# Patient Record
Sex: FEMALE | Race: BLACK OR AFRICAN AMERICAN | Employment: OTHER | URBAN - METROPOLITAN AREA
[De-identification: names, ages, dates, MRNs, and addresses within clinical notes are randomized per-mention and may not be internally consistent; named-entity substitution may affect disease eponyms.]

---

## 2017-05-21 RX ORDER — SODIUM CHLORIDE 9 MG/ML
25 INJECTION, SOLUTION INTRAVENOUS CONTINUOUS
Status: DISPENSED | OUTPATIENT
Start: 2017-05-24 | End: 2017-05-25

## 2017-05-24 ENCOUNTER — HOSPITAL ENCOUNTER (OUTPATIENT)
Dept: INFUSION THERAPY | Age: 72
Discharge: HOME | End: 2017-05-24
Payer: MEDICARE

## 2017-05-24 VITALS
HEART RATE: 80 BPM | OXYGEN SATURATION: 99 % | RESPIRATION RATE: 16 BRPM | DIASTOLIC BLOOD PRESSURE: 69 MMHG | TEMPERATURE: 98 F | SYSTOLIC BLOOD PRESSURE: 146 MMHG

## 2017-05-24 PROCEDURE — 74011250636 HC RX REV CODE- 250/636

## 2017-05-24 PROCEDURE — 96366 THER/PROPH/DIAG IV INF ADDON: CPT

## 2017-05-24 PROCEDURE — 96365 THER/PROPH/DIAG IV INF INIT: CPT

## 2017-05-24 RX ORDER — LOSARTAN POTASSIUM 50 MG/1
50 TABLET ORAL DAILY
COMMUNITY

## 2017-05-24 RX ORDER — SODIUM CHLORIDE 9 MG/ML
50 INJECTION, SOLUTION INTRAVENOUS CONTINUOUS
Status: CANCELLED | OUTPATIENT
Start: 2017-05-24 | End: 2017-05-25

## 2017-05-24 RX ORDER — SODIUM CHLORIDE 0.9 % (FLUSH) 0.9 %
5-10 SYRINGE (ML) INJECTION AS NEEDED
Status: CANCELLED | OUTPATIENT
Start: 2017-05-24 | End: 2017-05-25

## 2017-05-24 RX ORDER — PRAVASTATIN SODIUM 20 MG/1
20 TABLET ORAL
COMMUNITY
End: 2021-03-27

## 2017-05-24 RX ADMIN — SODIUM CHLORIDE 25 ML/HR: 900 INJECTION, SOLUTION INTRAVENOUS at 13:48

## 2017-05-24 RX ADMIN — IRON SUCROSE 300 MG: 20 INJECTION, SOLUTION INTRAVENOUS at 13:48

## 2017-05-24 NOTE — PROGRESS NOTES
Outpatient Infusion Center Short Visit Progress Note    1330 Pt admit to [facility] for Venofer day 1 ambulatory in stable condition. Assessment completed. No new concerns voiced. Visit Vitals    /79 (BP 1 Location: Left arm, BP Patient Position: Sitting)    Pulse 70    Temp 97.4 °F (36.3 °C)    Resp 18    SpO2 100%    Breastfeeding No       PIV started in left forearm. Medications:  Venofer    1545 Pt tolerated treatment well. D/c home ambulatory in no distress. Pt aware of next appointment scheduled for 6/7/17 for day 14 .

## 2017-06-07 ENCOUNTER — HOSPITAL ENCOUNTER (OUTPATIENT)
Dept: INFUSION THERAPY | Age: 72
Discharge: HOME | End: 2017-06-07
Payer: MEDICARE

## 2017-06-07 VITALS
HEART RATE: 66 BPM | DIASTOLIC BLOOD PRESSURE: 76 MMHG | RESPIRATION RATE: 16 BRPM | OXYGEN SATURATION: 98 % | SYSTOLIC BLOOD PRESSURE: 150 MMHG | TEMPERATURE: 98 F

## 2017-06-07 PROCEDURE — 96366 THER/PROPH/DIAG IV INF ADDON: CPT

## 2017-06-07 PROCEDURE — 74011250636 HC RX REV CODE- 250/636

## 2017-06-07 PROCEDURE — 96365 THER/PROPH/DIAG IV INF INIT: CPT

## 2017-06-07 RX ORDER — SODIUM CHLORIDE 0.9 % (FLUSH) 0.9 %
5-10 SYRINGE (ML) INJECTION AS NEEDED
Status: ACTIVE | OUTPATIENT
Start: 2017-06-07 | End: 2017-06-08

## 2017-06-07 RX ORDER — SODIUM CHLORIDE 9 MG/ML
25 INJECTION, SOLUTION INTRAVENOUS AS NEEDED
Status: DISPENSED | OUTPATIENT
Start: 2017-06-07 | End: 2017-06-08

## 2017-06-07 RX ADMIN — Medication 10 ML: at 13:43

## 2017-06-07 RX ADMIN — Medication 10 ML: at 15:45

## 2017-06-07 RX ADMIN — SODIUM CHLORIDE 25 ML/HR: 900 INJECTION, SOLUTION INTRAVENOUS at 13:45

## 2017-06-07 RX ADMIN — IRON SUCROSE 300 MG: 20 INJECTION, SOLUTION INTRAVENOUS at 13:58

## 2017-06-07 NOTE — PROGRESS NOTES
[facility] Short Visit Note:    1330:  Pt arrived ambulatory and in no distress, received iron sucrose (VENOFER) 300 mg in 0.9% sodium chloride 250 mL IVPB via 22g placed to the right FA without difficulty, patient tolerated well. Patient Vitals for the past 12 hrs:   Temp Pulse Resp BP SpO2   06/07/17 1329 98.1 °F (36.7 °C) 73 18 154/75 100 %     Infusion complete, IV flushed and removed, site covered with Coban. 1550: D/Cd from [facility] ambulatory and in no distress. Next visit 6/21/14 @ 1130.

## 2017-06-21 ENCOUNTER — HOSPITAL ENCOUNTER (OUTPATIENT)
Dept: INFUSION THERAPY | Age: 72
Discharge: HOME | End: 2017-06-21
Payer: MEDICARE

## 2017-06-21 VITALS
RESPIRATION RATE: 16 BRPM | DIASTOLIC BLOOD PRESSURE: 76 MMHG | SYSTOLIC BLOOD PRESSURE: 146 MMHG | TEMPERATURE: 97.7 F | HEART RATE: 73 BPM

## 2017-06-21 PROCEDURE — 96365 THER/PROPH/DIAG IV INF INIT: CPT

## 2017-06-21 PROCEDURE — 74011250636 HC RX REV CODE- 250/636

## 2017-06-21 PROCEDURE — 96366 THER/PROPH/DIAG IV INF ADDON: CPT

## 2017-06-21 RX ORDER — SODIUM CHLORIDE 0.9 % (FLUSH) 0.9 %
5-10 SYRINGE (ML) INJECTION AS NEEDED
Status: ACTIVE | OUTPATIENT
Start: 2017-06-21 | End: 2017-06-22

## 2017-06-21 RX ORDER — SODIUM CHLORIDE 9 MG/ML
25 INJECTION, SOLUTION INTRAVENOUS AS NEEDED
Status: DISPENSED | OUTPATIENT
Start: 2017-06-21 | End: 2017-06-22

## 2017-06-21 RX ADMIN — SODIUM CHLORIDE 25 ML/HR: 900 INJECTION, SOLUTION INTRAVENOUS at 11:40

## 2017-06-21 RX ADMIN — Medication 10 ML: at 11:40

## 2017-06-21 RX ADMIN — IRON SUCROSE 400 MG: 20 INJECTION, SOLUTION INTRAVENOUS at 12:22

## 2017-06-21 NOTE — PROGRESS NOTES
Outpatient Infusion Center Progress Note    5798 Pt admit to [facility] for day 28 Venofer ambulatory in stable condition. Assessment completed. No new concerns voiced. Peripheral IV accessed in right arm with positive blood return. PIV flushed and NS started at [facility]. Visit Vitals    /76 (BP 1 Location: Left arm, BP Patient Position: Sitting)    Pulse 73    Temp 97.7 °F (36.5 °C)    Resp 16    Breastfeeding No       Medications:  NS KVO  Venofer over 150 minutes    Vitals taken on admission, prior to infusion, and immediately after infusion. Patient Vitals for the past 12 hrs:   Temp Pulse Resp BP   06/21/17 1516 97.7 °F (36.5 °C) 73 16 146/76   06/21/17 1219 97 °F (36.1 °C) (!) 59 16 122/65   06/21/17 1129 98.6 °F (37 °C) 72 16 (!) 131/93       1520 Pt tolerated treatment well. D/c home ambulatory in no distress. There are no other appointments scheduled at this time.

## 2021-03-27 ENCOUNTER — APPOINTMENT (OUTPATIENT)
Dept: MRI IMAGING | Age: 76
End: 2021-03-27
Attending: INTERNAL MEDICINE
Payer: MEDICARE

## 2021-03-27 ENCOUNTER — APPOINTMENT (OUTPATIENT)
Dept: CT IMAGING | Age: 76
End: 2021-03-27
Attending: EMERGENCY MEDICINE
Payer: MEDICARE

## 2021-03-27 ENCOUNTER — HOSPITAL ENCOUNTER (OUTPATIENT)
Age: 76
Setting detail: OBSERVATION
Discharge: HOME | End: 2021-03-30
Attending: EMERGENCY MEDICINE | Admitting: INTERNAL MEDICINE
Payer: MEDICARE

## 2021-03-27 DIAGNOSIS — C34.90 LUNG CANCER METASTATIC TO BRAIN (HCC): ICD-10-CM

## 2021-03-27 DIAGNOSIS — Z71.89 GOALS OF CARE, COUNSELING/DISCUSSION: ICD-10-CM

## 2021-03-27 DIAGNOSIS — R91.8 MASS OF LUNG: Primary | ICD-10-CM

## 2021-03-27 DIAGNOSIS — E43 SEVERE PROTEIN-CALORIE MALNUTRITION (GOMEZ: LESS THAN 60% OF STANDARD WEIGHT) (HCC): ICD-10-CM

## 2021-03-27 DIAGNOSIS — Z71.89 DNR (DO NOT RESUSCITATE) DISCUSSION: ICD-10-CM

## 2021-03-27 DIAGNOSIS — C79.31 LUNG CANCER METASTATIC TO BRAIN (HCC): ICD-10-CM

## 2021-03-27 DIAGNOSIS — C80.1 CANCER (HCC): ICD-10-CM

## 2021-03-27 DIAGNOSIS — Z71.89 ADVANCED CARE PLANNING/COUNSELING DISCUSSION: ICD-10-CM

## 2021-03-27 PROBLEM — E78.5 HYPERLIPIDEMIA: Status: ACTIVE | Noted: 2021-03-27

## 2021-03-27 PROBLEM — I10 HYPERTENSION: Status: ACTIVE | Noted: 2021-03-27

## 2021-03-27 PROBLEM — C79.9 METASTATIC CANCER (HCC): Status: ACTIVE | Noted: 2021-03-27

## 2021-03-27 PROBLEM — R20.0 NUMBNESS AND TINGLING OF LEFT HAND: Status: ACTIVE | Noted: 2021-03-27

## 2021-03-27 PROBLEM — R20.2 NUMBNESS AND TINGLING OF LEFT HAND: Status: ACTIVE | Noted: 2021-03-27

## 2021-03-27 LAB
ALBUMIN SERPL-MCNC: 3.2 G/DL (ref 3.5–5)
ALBUMIN/GLOB SERPL: 0.7 {RATIO} (ref 1.1–2.2)
ALP SERPL-CCNC: 175 U/L (ref 45–117)
ALT SERPL-CCNC: 30 U/L (ref 12–78)
ANION GAP SERPL CALC-SCNC: 7 MMOL/L (ref 5–15)
APTT PPP: 24.8 SEC (ref 22.1–31)
AST SERPL-CCNC: 41 U/L (ref 15–37)
BASOPHILS # BLD: 0 K/UL (ref 0–0.1)
BASOPHILS NFR BLD: 0 % (ref 0–1)
BILIRUB SERPL-MCNC: 0.5 MG/DL (ref 0.2–1)
BUN SERPL-MCNC: 10 MG/DL (ref 6–20)
BUN/CREAT SERPL: 18 (ref 12–20)
CALCIUM SERPL-MCNC: 9.2 MG/DL (ref 8.5–10.1)
CHLORIDE SERPL-SCNC: 100 MMOL/L (ref 97–108)
CHOLEST SERPL-MCNC: 224 MG/DL
CO2 SERPL-SCNC: 29 MMOL/L (ref 21–32)
COMMENT, HOLDF: NORMAL
CREAT SERPL-MCNC: 0.57 MG/DL (ref 0.55–1.02)
DIFFERENTIAL METHOD BLD: ABNORMAL
EOSINOPHIL # BLD: 0 K/UL (ref 0–0.4)
EOSINOPHIL NFR BLD: 0 % (ref 0–7)
ERYTHROCYTE [DISTWIDTH] IN BLOOD BY AUTOMATED COUNT: 13.6 % (ref 11.5–14.5)
EST. AVERAGE GLUCOSE BLD GHB EST-MCNC: 103 MG/DL
GLOBULIN SER CALC-MCNC: 4.3 G/DL (ref 2–4)
GLUCOSE BLD STRIP.AUTO-MCNC: 103 MG/DL (ref 65–100)
GLUCOSE SERPL-MCNC: 96 MG/DL (ref 65–100)
HBA1C MFR BLD: 5.2 % (ref 4–5.6)
HCT VFR BLD AUTO: 36 % (ref 35–47)
HDLC SERPL-MCNC: 55 MG/DL
HDLC SERPL: 4.1 {RATIO} (ref 0–5)
HGB BLD-MCNC: 12.2 G/DL (ref 11.5–16)
IMM GRANULOCYTES # BLD AUTO: 0 K/UL (ref 0–0.04)
IMM GRANULOCYTES NFR BLD AUTO: 0 % (ref 0–0.5)
INR PPP: 1.1 (ref 0.9–1.1)
LDLC SERPL CALC-MCNC: 156.2 MG/DL (ref 0–100)
LIPASE SERPL-CCNC: 59 U/L (ref 73–393)
LIPID PROFILE,FLP: ABNORMAL
LYMPHOCYTES # BLD: 1.4 K/UL (ref 0.8–3.5)
LYMPHOCYTES NFR BLD: 16 % (ref 12–49)
MCH RBC QN AUTO: 29.6 PG (ref 26–34)
MCHC RBC AUTO-ENTMCNC: 33.9 G/DL (ref 30–36.5)
MCV RBC AUTO: 87.4 FL (ref 80–99)
MONOCYTES # BLD: 0.3 K/UL (ref 0–1)
MONOCYTES NFR BLD: 4 % (ref 5–13)
NEUTS SEG # BLD: 6.7 K/UL (ref 1.8–8)
NEUTS SEG NFR BLD: 80 % (ref 32–75)
NRBC # BLD: 0 K/UL (ref 0–0.01)
NRBC BLD-RTO: 0 PER 100 WBC
PLATELET # BLD AUTO: 318 K/UL (ref 150–400)
PMV BLD AUTO: 9.5 FL (ref 8.9–12.9)
POTASSIUM SERPL-SCNC: 3.5 MMOL/L (ref 3.5–5.1)
PROT SERPL-MCNC: 7.5 G/DL (ref 6.4–8.2)
PROTHROMBIN TIME: 11.2 SEC (ref 9–11.1)
RBC # BLD AUTO: 4.12 M/UL (ref 3.8–5.2)
SAMPLES BEING HELD,HOLD: NORMAL
SERVICE CMNT-IMP: ABNORMAL
SODIUM SERPL-SCNC: 136 MMOL/L (ref 136–145)
THERAPEUTIC RANGE,PTTT: NORMAL SECS (ref 58–77)
TRIGL SERPL-MCNC: 64 MG/DL (ref ?–150)
TROPONIN I SERPL-MCNC: <0.05 NG/ML
VLDLC SERPL CALC-MCNC: 12.8 MG/DL
WBC # BLD AUTO: 8.4 K/UL (ref 3.6–11)

## 2021-03-27 PROCEDURE — 74011250636 HC RX REV CODE- 250/636: Performed by: INTERNAL MEDICINE

## 2021-03-27 PROCEDURE — 93005 ELECTROCARDIOGRAM TRACING: CPT

## 2021-03-27 PROCEDURE — 96374 THER/PROPH/DIAG INJ IV PUSH: CPT

## 2021-03-27 PROCEDURE — 82962 GLUCOSE BLOOD TEST: CPT

## 2021-03-27 PROCEDURE — 85025 COMPLETE CBC W/AUTO DIFF WBC: CPT

## 2021-03-27 PROCEDURE — 99218 HC RM OBSERVATION: CPT

## 2021-03-27 PROCEDURE — 99285 EMERGENCY DEPT VISIT HI MDM: CPT

## 2021-03-27 PROCEDURE — 74011000250 HC RX REV CODE- 250: Performed by: HOSPITALIST

## 2021-03-27 PROCEDURE — 71260 CT THORAX DX C+: CPT

## 2021-03-27 PROCEDURE — A9575 INJ GADOTERATE MEGLUMI 0.1ML: HCPCS | Performed by: INTERNAL MEDICINE

## 2021-03-27 PROCEDURE — 83036 HEMOGLOBIN GLYCOSYLATED A1C: CPT

## 2021-03-27 PROCEDURE — 36600 WITHDRAWAL OF ARTERIAL BLOOD: CPT

## 2021-03-27 PROCEDURE — 65270000029 HC RM PRIVATE

## 2021-03-27 PROCEDURE — 85730 THROMBOPLASTIN TIME PARTIAL: CPT

## 2021-03-27 PROCEDURE — 83690 ASSAY OF LIPASE: CPT

## 2021-03-27 PROCEDURE — 70450 CT HEAD/BRAIN W/O DYE: CPT

## 2021-03-27 PROCEDURE — 74011000636 HC RX REV CODE- 636: Performed by: RADIOLOGY

## 2021-03-27 PROCEDURE — 70498 CT ANGIOGRAPHY NECK: CPT

## 2021-03-27 PROCEDURE — 36415 COLL VENOUS BLD VENIPUNCTURE: CPT

## 2021-03-27 PROCEDURE — 74177 CT ABD & PELVIS W/CONTRAST: CPT

## 2021-03-27 PROCEDURE — 80053 COMPREHEN METABOLIC PANEL: CPT

## 2021-03-27 PROCEDURE — 85610 PROTHROMBIN TIME: CPT

## 2021-03-27 PROCEDURE — 70553 MRI BRAIN STEM W/O & W/DYE: CPT

## 2021-03-27 PROCEDURE — 74011250637 HC RX REV CODE- 250/637: Performed by: INTERNAL MEDICINE

## 2021-03-27 PROCEDURE — 74011250637 HC RX REV CODE- 250/637: Performed by: HOSPITALIST

## 2021-03-27 PROCEDURE — 80061 LIPID PANEL: CPT

## 2021-03-27 PROCEDURE — 84484 ASSAY OF TROPONIN QUANT: CPT

## 2021-03-27 RX ORDER — SODIUM CHLORIDE 0.9 % (FLUSH) 0.9 %
5-40 SYRINGE (ML) INJECTION EVERY 8 HOURS
Status: DISCONTINUED | OUTPATIENT
Start: 2021-03-27 | End: 2021-03-30 | Stop reason: HOSPADM

## 2021-03-27 RX ORDER — SIMETHICONE 80 MG
80 TABLET,CHEWABLE ORAL
Status: DISCONTINUED | OUTPATIENT
Start: 2021-03-27 | End: 2021-03-30 | Stop reason: HOSPADM

## 2021-03-27 RX ORDER — SODIUM CHLORIDE 0.9 % (FLUSH) 0.9 %
5-40 SYRINGE (ML) INJECTION AS NEEDED
Status: DISCONTINUED | OUTPATIENT
Start: 2021-03-27 | End: 2021-03-30 | Stop reason: HOSPADM

## 2021-03-27 RX ORDER — LOSARTAN POTASSIUM 50 MG/1
50 TABLET ORAL DAILY
Status: DISCONTINUED | OUTPATIENT
Start: 2021-03-27 | End: 2021-03-30 | Stop reason: HOSPADM

## 2021-03-27 RX ORDER — HYDRALAZINE HYDROCHLORIDE 20 MG/ML
20 INJECTION INTRAMUSCULAR; INTRAVENOUS
Status: DISCONTINUED | OUTPATIENT
Start: 2021-03-27 | End: 2021-03-30 | Stop reason: HOSPADM

## 2021-03-27 RX ORDER — SODIUM CHLORIDE 9 MG/ML
75 INJECTION, SOLUTION INTRAVENOUS CONTINUOUS
Status: DISCONTINUED | OUTPATIENT
Start: 2021-03-27 | End: 2021-03-27

## 2021-03-27 RX ORDER — ONDANSETRON 2 MG/ML
4 INJECTION INTRAMUSCULAR; INTRAVENOUS
Status: DISCONTINUED | OUTPATIENT
Start: 2021-03-27 | End: 2021-03-30 | Stop reason: HOSPADM

## 2021-03-27 RX ORDER — ACETAMINOPHEN 325 MG/1
650 TABLET ORAL
Status: DISCONTINUED | OUTPATIENT
Start: 2021-03-27 | End: 2021-03-30 | Stop reason: HOSPADM

## 2021-03-27 RX ORDER — NALOXONE HYDROCHLORIDE 0.4 MG/ML
0.4 INJECTION, SOLUTION INTRAMUSCULAR; INTRAVENOUS; SUBCUTANEOUS AS NEEDED
Status: DISCONTINUED | OUTPATIENT
Start: 2021-03-27 | End: 2021-03-30 | Stop reason: HOSPADM

## 2021-03-27 RX ORDER — GADOTERATE MEGLUMINE 376.9 MG/ML
10 INJECTION INTRAVENOUS
Status: COMPLETED | OUTPATIENT
Start: 2021-03-27 | End: 2021-03-27

## 2021-03-27 RX ORDER — ATORVASTATIN CALCIUM 20 MG/1
40 TABLET, FILM COATED ORAL
Status: DISCONTINUED | OUTPATIENT
Start: 2021-03-27 | End: 2021-03-30 | Stop reason: HOSPADM

## 2021-03-27 RX ADMIN — Medication 10 ML: at 16:08

## 2021-03-27 RX ADMIN — LIDOCAINE HYDROCHLORIDE 40 ML: 20 SOLUTION ORAL; TOPICAL at 21:12

## 2021-03-27 RX ADMIN — ATORVASTATIN CALCIUM 40 MG: 20 TABLET, FILM COATED ORAL at 21:07

## 2021-03-27 RX ADMIN — HYDRALAZINE HYDROCHLORIDE 20 MG: 20 INJECTION INTRAMUSCULAR; INTRAVENOUS at 18:27

## 2021-03-27 RX ADMIN — SODIUM CHLORIDE 75 ML/HR: 9 INJECTION, SOLUTION INTRAVENOUS at 16:08

## 2021-03-27 RX ADMIN — Medication 10 ML: at 21:16

## 2021-03-27 RX ADMIN — GADOTERATE MEGLUMINE 10 ML: 376.9 INJECTION INTRAVENOUS at 17:46

## 2021-03-27 RX ADMIN — IOPAMIDOL 100 ML: 755 INJECTION, SOLUTION INTRAVENOUS at 13:42

## 2021-03-27 NOTE — H&P
[facility]  [address], [place], [facility] [address]  [phone]    Admission History and Physical      NAME:  Patrica Gonzalez   :   1945   MRN:  [id number]     PCP:  Brando Bye MD     Date/Time:  3/27/2021         Subjective:     CHIEF COMPLAINT: \"I had numbness in my L hand\"      HISTORY OF PRESENT ILLNESS:     Ms. Debbie Ruffin is a 77 y.o.  female with PMH of HTN presenting to the ED with L hand numbness. Pt was a  and states that the numbness was isolated to the L hand and particularly to the first three digits. No extension/radiation. Also endorses a 30 + pound unintentional weight loss over the last few months. Apparently was told roughly a year ago that she had an \"abnormality in her lungs\" but stated that no-one ever followed up. When asked why she didn't pursue work-up she didn't seem to have an answer. Did ask that if this is cancer whether herbal therapy could help    Past Medical History:   Diagnosis Date    Hypertension         No past surgical history on file. Social History     Tobacco Use    Smoking status: Never Smoker   Substance Use Topics    Alcohol use: No        Family History   Problem Relation Age of Onset    Asthma Mother     Heart Disease Father         Allergies   Allergen Reactions    Aspirin Other (comments)     Eyes swelling. Prior to Admission medications    Medication Sig Start Date End Date Taking? Authorizing Provider   losartan (COZAAR) 50 mg tablet Take 50 mg by mouth daily.    Yes Provider, Historical         Review of Systems:  (bold if positive, if negative)    Gen:  Eyes:  ENT:  CVS:  Pulm:  GI:    :    MS:  Skin:  Psych:  Endo:    Hem:  Renal:    Neuro:     Weight loss        Objective:      VITALS:    Vital signs reviewed; most recent are:    Visit Vitals  BP (!) 137/117 (BP 1 Location: Right upper arm, BP Patient Position: At rest)   Pulse 91   Temp 98.4 °F (36.9 °C)   Resp 16   Ht 5' 3\" (1.6 m) Wt 45.7 kg (100 lb 12 oz)   SpO2 98%   BMI 17.85 kg/m²     SpO2 Readings from Last 6 Encounters:   03/27/21 98%   06/07/17 98%   05/24/17 99%   10/11/13 99%   07/16/13 97%   07/01/13 98%        No intake or output data in the 24 hours ending 03/27/21 1812         Exam:     Physical Exam:    Gen: Cachetic female  HEENT:  Pink conjunctivae, PERRL, hearing intact to voice, moist mucous membranes  Neck:  Supple, without masses, thyroid non-tender  Resp:  No accessory muscle use, clear breath sounds without wheezes rales or rhonchi  Card:  No murmurs, normal S1, S2 without thrills, bruits or peripheral edema  Abd:  Soft, non-tender, non-distended, normoactive bowel sounds are present, no palpable organomegaly  Lymph:  No cervical adenopathy  Musc:  No cyanosis or clubbing  Skin:  No rashes or ulcers, skin turgor is good  Neuro:  Cranial nerves 3-12 are grossly intact,  strength is 5/5 bilaterally, dorsi / plantarflexion strength is 5/5 bilaterally, follows commands appropriately  Psych: Moderate insight. Numbness in the L hand particularly the first three digits       Labs:    Recent Labs     03/27/21  1443   WBC 8.4   HGB 12.2   HCT 36.0        Recent Labs     03/27/21  1443      K 3.5      CO2 29   GLU 96   BUN 10   CREA 0.57   CA 9.2   ALB 3.2*   ALT 30     No components found for: GLPOC  No results for input(s): PH, PCO2, PO2, HCO3, FIO2 in the last 72 hours. Recent Labs     03/27/21  1443   INR 1.1     CT chest/ab=>  Chest:  1. Left lower lobe infrahilar mass measuring 5.6 x 4.5 cm, with obstruction of  the left lower lobe bronchus and complete atelectasis of the left lower lobe. This may represent primary lung malignancy. 2. Innumerable metastatic pulmonary masses and nodules throughout the lungs,  largest examples as given above. 3. Small left pleural effusion.     Abdomen/pelvis:  1. Numerous large hepatic metastases.   2. Indeterminate hypodense lesion in the lower pole of the right kidney  measuring 1.4 x 1.2 cm. This can be further evaluated with CT or MRI renal  Protocol. CT/CTA head =>   Moderate small vessel ischemic disease. Age-indeterminate lacunar infarcts. Assessment/Plan:     Metastatic cancer ([address] (3/27/2021) - unclear primary. Certainly concerning for metastatic cancer, possibly lung as primary. Will need biopsy. Perhaps either via CT guided biopsy of hepatic mets or EBUS, will defer to heme/onc   -consult heme onc   -check MRI brain to ensure her N/V, transient numbness/tingling is not from metastatic lesions though less likely   -palliative eval to help with goals of care     Numbness/tingling of L hand - appears to be consistent with carpal tunnel pathology (in first three digits) and pt was a  by [place]  -will ask PT to eval; perhaps can assist with stretches.  May benefit from brace   -MRI as above (will also eval for CVA though lower suspicion)   -hold on ASA due to allergy   -start statin due to LDL not at goal but LOW suspicion for TIA/CVA  -hold on OT/speech eval as not indication and LOW suspicion for TIA/CVA  -neuro eval only by teleneuro recs    Hyperlipidemia   -start statin;     Hypertension  -on losartan     Surrogate decision maker: Son     Total time spent with patient: [address] discussed with: Patient and Family    Discussed:  Care Plan    Prophylaxis:  SCD's    Probable Disposition:  Home w/Family           ___________________________________________________    Attending Physician: Chacorta Reilly MD

## 2021-03-27 NOTE — PROGRESS NOTES
Spiritual Care Assessment/Progress Note  [address]      NAME: Leslie Carreno      MRN: [id number]  AGE: 77 y.o. SEX: female  Scientology Affiliation: Other   Language: English     3/27/2021     Total Time (in minutes): 6     Spiritual Assessment begun in [facility] EMERGENCY DEPT through conversation with:         []Patient        [] Family    [] Friend(s)        Reason for Consult: Other (comment)(Code Stroke)     Spiritual beliefs: (Please include comment if needed)     [] Identifies with a herman tradition:         [] Supported by a herman community:            [] Claims no spiritual orientation:           [] Seeking spiritual identity:                [] Adheres to an individual form of spirituality:           [x] Not able to assess:                           Identified resources for coping:      [] Prayer                               [] Music                  [] Guided Imagery     [] Family/friends                 [] Pet visits     [] Devotional reading                         [x] Unknown     [] Other:                                           Interventions offered during this visit: (See comments for more details)    Patient Interventions: Initial visit(Attempted)           Plan of Care:     [] Support spiritual and/or cultural needs    [] Support AMD and/or advance care planning process      [] Support grieving process   [] Coordinate Rites and/or Rituals    [] Coordination with community clergy   [] No spiritual needs identified at this time   [] Detailed Plan of Care below (See Comments)  [] Make referral to Music Therapy  [] Make referral to Pet Therapy     [] Make referral to Addiction services  [] Make referral to [facility]  [] Make referral to Spiritual Care Partner  [] No future visits requested        [x] Follow up upon further referrals     Comments: Responded to Code Stroke in the ER. Miss Macho Sandhu was out for tests. Unable to assess.   Advised nurse to contact [facility] for any further referrals.   Visited by: Caio Junior., MS., [address] [facility] ([id number])

## 2021-03-27 NOTE — PROGRESS NOTES
[facility]: MED RECONCILIATION    Comments/Recommendations:   Medication information was obtained from the patient via telephone. Allergies: Aspirin    Prior to Admission Medications:     Medication Documentation Review Audit       Reviewed by KAYDEN JonesD (Pharmacist) on 03/27/21 at 1642      Medication Sig Documenting Provider Last Dose Status Taking?   losartan (COZAAR) 50 mg tablet Take 50 mg by mouth daily.  Provider, Historical 3/26/2021 Unknown time Active Yes                      KAYDEN RuffD

## 2021-03-27 NOTE — ED TRIAGE NOTES
Pt reports nausea, vomiting, and diarrhea onset yesterday. +decreased PO intake. Seen at [facility] today and referred to the ED for further evaluation. Hx of abdominal surgery about 3 years ago but unsure what surgery. Denies any pain. Hx of HTN and has not taken her medication yet today. Pt also noted numbness in her left arm since about 1300 yesterday and dizziness. Dizziness is now resolved. +weight loss of about 30 lbs in the last 6-8 months. Denies speech difficulty, unilateral weakness, or vision changes. Linn Hinojosa NP in triage to evaluate pt. Code S level 2 called in triage.

## 2021-03-27 NOTE — ED NOTES
11:24 AM  I have evaluated the patient as the Provider in Triage. I have reviewed Her vital signs and the triage nurse assessment. I have talked with the patient and any available family and advised that I am the provider in triage and have ordered the appropriate study to initiate their work up based on the clinical presentation during my assessment. I have advised that the patient will be accommodated in the Main ED as soon as possible. I have also requested to contact the triage nurse or myself immediately if the patient experiences any changes in their condition during this brief waiting period. Complains of dizziness that started yesterday acutely, word searching in triage. Noted to have left-sided facial droop. Code as level 2 called discussed with Dr. Tang Hay.   Emelia Yu NP

## 2021-03-27 NOTE — ED NOTES
TRANSFER - OUT REPORT:    Verbal report given to BRAVO Vasquez(name) on Bev Hassan  being transferred to 5th floor(unit) for routine progression of care       Report consisted of patients Situation, Background, Assessment and   Recommendations(SBAR). Information from the following report(s) SBAR, ED Summary, Intake/Output, MAR, Recent Results and Cardiac Rhythm NSR was reviewed with the receiving nurse. Lines:   Peripheral IV 03/27/21 Left Wrist (Active)   Site Assessment Clean, dry, & intact 03/27/21 1246   Phlebitis Assessment 0 03/27/21 1246   Infiltration Assessment 0 03/27/21 1246   Dressing Status Clean, dry, & intact 03/27/21 1246   Dressing Type Tape;Transparent 03/27/21 1246   Hub Color/Line Status Pink;Flushed 03/27/21 1246   Alcohol Cap Used Yes 03/27/21 1246        Opportunity for questions and clarification was provided.       Patient transported with:   Monitor  Registered Nurse

## 2021-03-28 PROBLEM — E43 SEVERE PROTEIN-CALORIE MALNUTRITION (GOMEZ: LESS THAN 60% OF STANDARD WEIGHT) (HCC): Status: ACTIVE | Noted: 2021-03-28

## 2021-03-28 PROBLEM — C79.31 LUNG CANCER METASTATIC TO BRAIN (HCC): Status: ACTIVE | Noted: 2021-03-28

## 2021-03-28 PROBLEM — C34.90 LUNG CANCER METASTATIC TO BRAIN (HCC): Status: ACTIVE | Noted: 2021-03-28

## 2021-03-28 LAB
ANION GAP SERPL CALC-SCNC: 6 MMOL/L (ref 5–15)
BASOPHILS # BLD: 0 K/UL (ref 0–0.1)
BASOPHILS NFR BLD: 0 % (ref 0–1)
BUN SERPL-MCNC: 13 MG/DL (ref 6–20)
BUN/CREAT SERPL: 22 (ref 12–20)
CALCIUM SERPL-MCNC: 8.9 MG/DL (ref 8.5–10.1)
CHLORIDE SERPL-SCNC: 103 MMOL/L (ref 97–108)
CO2 SERPL-SCNC: 29 MMOL/L (ref 21–32)
CREAT SERPL-MCNC: 0.6 MG/DL (ref 0.55–1.02)
DIFFERENTIAL METHOD BLD: ABNORMAL
EOSINOPHIL # BLD: 0 K/UL (ref 0–0.4)
EOSINOPHIL NFR BLD: 0 % (ref 0–7)
ERYTHROCYTE [DISTWIDTH] IN BLOOD BY AUTOMATED COUNT: 13.7 % (ref 11.5–14.5)
GLUCOSE SERPL-MCNC: 105 MG/DL (ref 65–100)
HCT VFR BLD AUTO: 35.2 % (ref 35–47)
HGB BLD-MCNC: 11.6 G/DL (ref 11.5–16)
IMM GRANULOCYTES # BLD AUTO: 0 K/UL (ref 0–0.04)
IMM GRANULOCYTES NFR BLD AUTO: 0 % (ref 0–0.5)
LYMPHOCYTES # BLD: 1.4 K/UL (ref 0.8–3.5)
LYMPHOCYTES NFR BLD: 17 % (ref 12–49)
MAGNESIUM SERPL-MCNC: 2.1 MG/DL (ref 1.6–2.4)
MCH RBC QN AUTO: 28.9 PG (ref 26–34)
MCHC RBC AUTO-ENTMCNC: 33 G/DL (ref 30–36.5)
MCV RBC AUTO: 87.6 FL (ref 80–99)
MONOCYTES # BLD: 0.5 K/UL (ref 0–1)
MONOCYTES NFR BLD: 7 % (ref 5–13)
NEUTS SEG # BLD: 6.1 K/UL (ref 1.8–8)
NEUTS SEG NFR BLD: 76 % (ref 32–75)
NRBC # BLD: 0 K/UL (ref 0–0.01)
NRBC BLD-RTO: 0 PER 100 WBC
PLATELET # BLD AUTO: 330 K/UL (ref 150–400)
PMV BLD AUTO: 9.6 FL (ref 8.9–12.9)
POTASSIUM SERPL-SCNC: 3.6 MMOL/L (ref 3.5–5.1)
RBC # BLD AUTO: 4.02 M/UL (ref 3.8–5.2)
SODIUM SERPL-SCNC: 138 MMOL/L (ref 136–145)
TROPONIN I SERPL-MCNC: <0.05 NG/ML
TROPONIN I SERPL-MCNC: <0.05 NG/ML
WBC # BLD AUTO: 8 K/UL (ref 3.6–11)

## 2021-03-28 PROCEDURE — 97161 PT EVAL LOW COMPLEX 20 MIN: CPT | Performed by: PHYSICAL THERAPIST

## 2021-03-28 PROCEDURE — 2709999900 HC NON-CHARGEABLE SUPPLY

## 2021-03-28 PROCEDURE — 85025 COMPLETE CBC W/AUTO DIFF WBC: CPT

## 2021-03-28 PROCEDURE — 84484 ASSAY OF TROPONIN QUANT: CPT

## 2021-03-28 PROCEDURE — 97116 GAIT TRAINING THERAPY: CPT | Performed by: PHYSICAL THERAPIST

## 2021-03-28 PROCEDURE — 65270000029 HC RM PRIVATE

## 2021-03-28 PROCEDURE — 99218 HC RM OBSERVATION: CPT

## 2021-03-28 PROCEDURE — 74011250637 HC RX REV CODE- 250/637: Performed by: INTERNAL MEDICINE

## 2021-03-28 PROCEDURE — 80048 BASIC METABOLIC PNL TOTAL CA: CPT

## 2021-03-28 PROCEDURE — 83735 ASSAY OF MAGNESIUM: CPT

## 2021-03-28 PROCEDURE — 36415 COLL VENOUS BLD VENIPUNCTURE: CPT

## 2021-03-28 PROCEDURE — 99205 OFFICE O/P NEW HI 60 MIN: CPT | Performed by: INTERNAL MEDICINE

## 2021-03-28 RX ADMIN — Medication 10 ML: at 13:56

## 2021-03-28 RX ADMIN — ATORVASTATIN CALCIUM 40 MG: 20 TABLET, FILM COATED ORAL at 21:59

## 2021-03-28 RX ADMIN — LOSARTAN POTASSIUM 50 MG: 50 TABLET, FILM COATED ORAL at 17:16

## 2021-03-28 RX ADMIN — LOSARTAN POTASSIUM 50 MG: 50 TABLET, FILM COATED ORAL at 00:27

## 2021-03-28 RX ADMIN — Medication 10 ML: at 21:59

## 2021-03-28 RX ADMIN — Medication 10 ML: at 06:38

## 2021-03-28 NOTE — ACP (ADVANCE CARE PLANNING)
Advance Care Planning Note Name: Priyanka Mancini YOB: 1945 MRN: [id number] Admission Date: 3/27/2021 11:27 AM  
 
Date of discussion:  3/27/2021 Active Diagnoses: Active Problems: Metastatic cancer ([facility] [address] (3/27/2021) Hypertension (3/27/2021) Hyperlipidemia (3/27/2021) Numbness and tingling of left hand (3/27/2021) Overview: Likely carpal tunnel These active diagnoses are of sufficient risk that focused discussion on advance care planning is indicated in order to allow the patient to thoughtfully consider personal goals of care; and, if situations arise that prevent the ability to personally give input, to ensure appropriate representation of their personal desires for different levels and aggressiveness of care. Discussion:  
 
Persons present and participating in discussion:  
  
Discussion: *** Time Spent:  
 
Total time spent face-to-face in education and discussion: *** minutes.   
  
 
  
 
Malika Vanessa MD

## 2021-03-28 NOTE — PROGRESS NOTES
Problem: Mobility Impaired (Adult and Pediatric)  Goal: *Acute Goals and Plan of Care (Insert Text)  Description: FUNCTIONAL STATUS PRIOR TO ADMISSION: Patient reports independence with functional mobility and ADL's prior to admission. She did endorse increasing CARMONA/SOB ( 3/10) with activity, and reports that she was old she had \"pneumonia\" in the ER.     HOME SUPPORT PRIOR TO ADMISSION: Patient lives with her . She is most concerned about significant wt loss, thus associated weakness/reduced stamina for activities. Physical Therapy Goals  Initiated 3/28/2021  1. Patient will move from supine to sit and sit to supine  in bed with independence within 7 day(s). 2.  Patient will transfer from bed to chair and chair to bed with modified independence using the least restrictive device within 7 day(s). 3.  Patient will perform sit to stand with modified independence within 7 day(s). 4.  Patient will ambulate with modified independence for 200 feet with the least restrictive device within 7 day(s). 5.  Patient will ascend/descend 1 flight stairs with single left handrail(s) with minimal assistance/contact guard assist within 7 day(s). Outcome: Progressing Towards Goal    PHYSICAL THERAPY EVALUATION  Patient: Abigail Coe (72 y.o. female)  Date: 3/28/2021  Primary Diagnosis: Metastatic cancer ([facility] [address] [C79.9]  Precautions:     ASSESSMENT  Based on the objective data described below, the patient presents with generalized weakness, and worsening CARMONA with functional activity. MRI without evidence of CVA but denotes metastatic disease with lung as possible primary site. Pt endorses left carpal tunnel discomfort, and recommendations for OTC resting splint advised by MD at bedside. Recommendations for OPPT also provided to provider at bedside. Pt felt limitations would not hinder participation using RW for ambulation. She reports increased reduction in stamina, strength, and increased CARMONA with activities. Pt able to transition to bedside with SBA for safety, and CGA for dynamic activity. Able to ambulate slow joe using RW without difficulty for 300 feet. Returned to bedside chair with chair alarm provided and activated. VSS throughout , and SPO2 acceptable ranges. Anticipate she will benefit from OPPT referral for left carpal tunnel, and continued mobility for endurance/strengthening to prevent further decline in function in the setting of medical diagnosis. Oncology is following the patient, and neurology is not indicated at this time. Current Level of Function Impacting Discharge (mobility/balance): Pt utilized RW today for stability, but was not actively using AD prior to admission. Functional Outcome Measure: The patient scored 90/100 on the Barthel Index outcome measure. Other factors to consider for discharge: Generalized weakness, significant loss of weight, and CARMONA      Patient will benefit from skilled therapy intervention to address the above noted impairments. PLAN :  Recommendations and Planned Interventions: bed mobility training, transfer training, gait training, therapeutic exercises, patient and family training/education, and therapeutic activities       Frequency/Duration: Patient will be followed by physical therapy:  4 times a week to address goals. Recommendation for discharge: (in order for the patient to meet his/her long term goals)  Outpatient physical therapy follow up recommended for Left Carpal Tunnel dysfunction. ( MD aware)  HHPT may be beneficial if functional capacity remains below her baseline for safety evaluation and need of assistive device. This discharge recommendation:  Has not yet been discussed the attending provider and/or case management    IF patient discharges home will need the following DME: to be determined (TBD)         SUBJECTIVE:   Patient stated I would like to know how I can get some weight back on these bones.     OBJECTIVE DATA SUMMARY:   HISTORY:    Past Medical History:   Diagnosis Date    Hypertension    No past surgical history on file. Personal factors and/or comorbidities impacting plan of care: significant loss of weight, generalized weakness, and reduced stamina. Home Situation  Home Environment: Private residence  # Steps to Enter: 0  Rails to Enter: No  Wheelchair Ramp: No  One/Two Story Residence: Two story  # of Interior Steps: 15  Interior Rails: Both  Lift Chair Available: No  Living Alone: No  Support Systems: Child(caroline), Spouse/Significant Other/Partner  Patient Expects to be Discharged to[de-identified] Private residence  Current DME Used/Available at Home: None  Tub or Shower Type: Tub/Shower combination    EXAMINATION/PRESENTATION/DECISION MAKING:   Critical Behavior:  Neurologic State: Appropriate for age, Alert  Orientation Level: Appropriate for age, Oriented X4  Cognition: Appropriate for age attention/concentration, Appropriate safety awareness, Follows commands  Safety/Judgement: Awareness of environment, Good awareness of safety precautions, Insight into deficits  Hearing: Auditory  Auditory Impairment: None  Skin:  very frail lady  Edema: NA  Range Of Motion:  AROM: Generally decreased, functional   Strength:    Strength: Generally decreased, functional   Tone & Sensation:   Tone: Normal   Sensation: Impaired(Carpal tunnel left hand and lateral 3 digits)     Functional Mobility:  Bed Mobility:     Supine to Sit: Modified independent  Sit to Supine: Supervision  Scooting: Supervision  Transfers:  Sit to Stand: Contact guard assistance  Stand to Sit: Contact guard assistance  Stand Pivot Transfers: Contact guard assistance     Bed to Chair: Contact guard assistance  Balance:   Sitting: Intact  Standing: Intact; With support  Ambulation/Gait Training:  Distance (ft): 300 Feet (ft)  Assistive Device: Walker, rolling  Ambulation - Level of Assistance: Contact guard assistance  Gait Description (WDL): Exceptions to WDL  Gait Abnormalities: Path deviations; Step to gait  Base of Support: Narrowed  Speed/Rosanna: Pace decreased (<100 feet/min)  Step Length: Right shortened;Left shortened      Stairs:  Number of Stairs Trained: 0  Functional Measure:  Barthel Index:    Bathin  Bladder: 10  Bowels: 10  Groomin  Dressing: 10  Feeding: 10  Mobility: 10  Stairs: 10  Toilet Use: 10  Transfer (Bed to Chair and Back): 10  Total: 90/100       The Barthel ADL Index: Guidelines  1. The index should be used as a record of what a patient does, not as a record of what a patient could do. 2. The main aim is to establish degree of independence from any help, physical or verbal, however minor and for whatever reason. 3. The need for supervision renders the patient not independent. 4. A patient's performance should be established using the best available evidence. Asking the patient, friends/relatives and nurses are the usual sources, but direct observation and common sense are also important. However direct testing is not needed. 5. Usually the patient's performance over the preceding 24-48 hours is important, but occasionally longer periods will be relevant. 6. Middle categories imply that the patient supplies over 50 per cent of the effort. 7. Use of aids to be independent is allowed. Areatha Negus., Barthel, D.W. (1821). Functional evaluation: the Barthel Index. [address] [facility] (14)2. CHARLOTTE Leon, Ghislaine Baca, Xavier Rain, [place], [address] [facility] (). Measuring the change indisability after inpatient rehabilitation; comparison of the responsiveness of the Barthel Index and Functional [place]fr[place] Measure. Journal of Neurology, Neurosurgery, and Psychiatry, 66(4), 341-620. Mykel Gonzalez, N.J.A, TRACI Miller, & Cyril Mckeon, M.A. (2004.) Assessment of post-stroke quality of life in cost-effectiveness studies: The usefulness of the Barthel Index and the EuroQoL-5D.  Quality of Life Research, 13, 317-24           Physical Therapy Evaluation Charge Determination   History Examination Presentation Decision-Making   LOW Complexity : Zero comorbidities / personal factors that will impact the outcome / POC LOW Complexity : 1-2 Standardized tests and measures addressing body structure, function, activity limitation and / or participation in recreation  LOW Complexity : Stable, uncomplicated  LOW Complexity : FOTO score of       Based on the above components, the patient evaluation is determined to be of the following complexity level: LOW     Pain Rating:  Denies pain with mobility    Activity Tolerance:   Fair, SpO2 stable on RA, and observed SOB with activity    After treatment patient left in no apparent distress:   Sitting in chair, Call bell within reach, and Bed / chair alarm activated    COMMUNICATION/EDUCATION:   The patients plan of care was discussed with: Registered nurse and Certified nursing assistant/patient care technician. Fall prevention education was provided and the patient/caregiver indicated understanding., Patient/family have participated as able in goal setting and plan of care. , and Patient/family agree to work toward stated goals and plan of care.     Thank you for this referral.  Ada Camp, PT   Time Calculation: 30 mins

## 2021-03-28 NOTE — PROGRESS NOTES
[address] [facility] [address], [address] [facility], [address] [place] [address]  [phone]        Oncology Note        Patient: My Hernandez MRN: [id number]  SSN: [id number]    YOB: 1945  Age: 77 y.o. Sex: female      Subjective:      My Hernandez is a 77 y.o. female who is admitted from the ED for weakness and dyspnea. She has lost over 30 lbs in the last 6 months. Dyspnea is slowly progressive. She has a history of cigarette smoking. She denies headache or bone pains. Review of Systems:    Constitutional: positive for weight loss  Eyes: negative  Ears, Nose, Mouth, Throat, and Face: negative  Respiratory: negative  Cardiovascular: negative  Gastrointestinal: negative  Genitourinary:negative  Integument/Breast: negative  Hematologic/Lymphatic: negative  Musculoskeletal:negative  Neurological: negative        Past Medical History:   Diagnosis Date    Hypertension      No past surgical history on file. Family History   Problem Relation Age of Onset    Asthma Mother     Heart Disease Father      Social History     Tobacco Use    Smoking status: Never Smoker   Substance Use Topics    Alcohol use: No      Prior to Admission medications    Medication Sig Start Date End Date Taking? Authorizing Provider   losartan (COZAAR) 50 mg tablet Take 50 mg by mouth daily. Yes Provider, Historical              Allergies   Allergen Reactions    Aspirin Other (comments)     Eyes swelling. Objective:     Vitals:    03/28/21 0906 03/28/21 1137 03/28/21 1140 03/28/21 1546   BP: (!) 144/86 (!) 151/95 (!) 155/90 (!) 168/84   Pulse: 92 95  85   Resp: 16 16  16   Temp: 97.6 °F (36.4 °C) 97.6 °F (36.4 °C)  98 °F (36.7 °C)   SpO2: 94% 98%  97%   Weight:       Height:                Physical Exam:    GENERAL: alert, cooperative, no distress, appears stated age, cachectic  EYE: conjunctivae/corneas clear.  PERRL, EOM's intact  LYMPHATIC: Cervical, supraclavicular, and axillary nodes normal.   THROAT & NECK: normal and no erythema or exudates noted. LUNG: clear to auscultation bilaterally  HEART: regular rate and rhythm  ABDOMEN: soft, non-tender  EXTREMITIES:  no cyanosis or edema  SKIN: Normal.  NEUROLOGIC: AOx3. Gait normal. Reflexes and motor strength normal and symmetric. Cranial nerves 2-12 and sensation grossly intact. Lab Results   Component Value Date/Time    WBC 8.0 03/28/2021 12:18 AM    HGB 11.6 03/28/2021 12:18 AM    HCT 35.2 03/28/2021 12:18 AM    PLATELET 776 36/37/1562 12:18 AM    MCV 87.6 03/28/2021 12:18 AM         Lab Results   Component Value Date/Time    Sodium 138 03/28/2021 12:18 AM    Potassium 3.6 03/28/2021 12:18 AM    Chloride 103 03/28/2021 12:18 AM    CO2 29 03/28/2021 12:18 AM    Anion gap 6 03/28/2021 12:18 AM    Glucose 105 (H) 03/28/2021 12:18 AM    BUN 13 03/28/2021 12:18 AM    Creatinine 0.60 03/28/2021 12:18 AM    BUN/Creatinine ratio 22 (H) 03/28/2021 12:18 AM    GFR est AA >60 03/28/2021 12:18 AM    GFR est non-AA >60 03/28/2021 12:18 AM    Calcium 8.9 03/28/2021 12:18 AM    Bilirubin, total 0.5 03/27/2021 02:43 PM    Alk. phosphatase 175 (H) 03/27/2021 02:43 PM    Protein, total 7.5 03/27/2021 02:43 PM    Albumin 3.2 (L) 03/27/2021 02:43 PM    Globulin 4.3 (H) 03/27/2021 02:43 PM    A-G Ratio 0.7 (L) 03/27/2021 02:43 PM    ALT (SGPT) 30 03/27/2021 02:43 PM    AST (SGOT) 41 (H) 03/27/2021 02:43 PM         CT Results (most recent):  Results from [place] encounter on 03/27/21   CT ABD PELV W CONT    Narrative EXAM:  CT CHEST W CONT, CT ABD PELV W CONT    INDICATION: Abnormal CXR, 30+ lb weight loss over past 6 months, chronic cough    COMPARISON: None. CONTRAST:  100 mL of Isovue-370. TECHNIQUE:   Following the uneventful intravenous administration of contrast, thin axial  images were obtained through the chest, abdomen and pelvis.  Coronal and sagittal  reconstructions were generated. Oral contrast was not administered. CT dose  reduction was achieved through use of a standardized protocol tailored for this  examination and automatic exposure control for dose modulation. FINDINGS:   Chest:  Lungs/Pleura: There is complete atelectasis of the left lower lobe with a mass  in the left lower lobe infrahilar region obstructing the left lower lobe  bronchus, which appears to measure approximately 5.6 x 4.5 cm. There are  innumerable other pulmonary nodules/masses, largest examples as below:  Right upper lobe mass measuring 2.8 x 2.5 cm (series 4 image 17). Right lower lobe mass measuring 3.5 x 3.2 cm (series 4 image 32). Right middle lobe mass measuring 2.3 x 1.6 and meter (series 4 image 35). Left upper 1lobe mass measuring 4.6 x 4.0 cm (series 4 image 627). Left upper lobe mass measuring 2.8 x 2.5 cm (series 4 image 12). Small left pleural effusion. Axilla/Soft Tissue: No pathologic axillary adenopathy. Multinodular thyroid  gland, largest nodule in the right thyroid lobe measuring 16 mm. Mediastinum: Cardiomegaly. Trace pericardial effusion. No pathologic mediastinal  or hilar adenopathy. Bones: No evidence of acute fracture, dislocation, or aggressive osseous  abnormality. Abdomen/Pelvis:  Liver:  Numerous heterogeneous enhancing, hypodense hepatic lesions, consistent  with metastases, largest examples as below:  Segment 2/3 metastasis measuring 7.4 x 5.5 cm (series 8 image 19). Segment 2/3 metastasis measuring 4.3 x 4.2 cm (series 8 image 27). Segment 4 metastasis measuring 4.8 x 4.0 cm (series 8 image 28). Segment 8 metastasis measuring 3.5 x 2.9 cm (series 8 image 21). Biliary system: Gallbladder is markable. No intrahepatic or extrahepatic biliary  ductal dilatation. Spleen: Normal.    Pancreas: Normal.    Kidneys/Ureters/Bladder: Indeterminant hypodense lesion in the lower pole the  right kidney measuring 1.4 x 1.2 cm (series 610 image 41).  There are multiple  other simple cysts in the kidneys, largest in the lower pole the right kidney  measuring 2.5 cm, which do not require follow-up. No renal or ureteral calculi. No hydronephrosis or hydroureter. The bladder is normal.    Adrenals: Normal.    Stomach/bowel: No dilation or abnormal wall thickening is present. No free  intraperitoneal air noted. Reproductive Organs: Uterus is present. No suspicious adnexal masses. Vasculature: Normal caliber arteries. Moderate calcific atherosclerosis of the  abdominal aorta and iliac vasculature. Portal vein, SMV, and splenic vein are  patent. Nodes: No pathologically enlarged lymph nodes. Fluid: Trace ascites. Bones/Soft Tissue: No acute fractures or aggressive osseous lesions are seen. Multilevel degenerative disc disease throughout the lumbar spine. Impression Chest:  1. Left lower lobe infrahilar mass measuring 5.6 x 4.5 cm, with obstruction of  the left lower lobe bronchus and complete atelectasis of the left lower lobe. This may represent primary lung malignancy. 2. Innumerable metastatic pulmonary masses and nodules throughout the lungs,  largest examples as given above. 3. Small left pleural effusion. Abdomen/pelvis:  1. Numerous large hepatic metastases. 2. Indeterminate hypodense lesion in the lower pole of the right kidney  measuring 1.4 x 1.2 cm. This can be further evaluated with CT or MRI renal  protocol. Assessment:     1. 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 I explained her the stage of the disease, pathophysiology of the disease and the treatment approaches. I answered all her questions. More than 50% of the time was utilized in education, counseling and co-ordination of care. I have ordered a Biopsy of the liver metastasis  I have asked Rad-Onc for consultation with plans for Gamma knife in the future      2. Severe protein calorie malnutrition    Protein supplementation   Dietician consultation    I spoke to the son and niece of the patient and relayed the plans      Plan:     1. USG guided Bx of the liver  2.  Dietician consultation  3. Rad-Onc consultation - Gamma knife      Signed By: Shimon Ovalle MD     March 28, 2021

## 2021-03-28 NOTE — PROGRESS NOTES
Spiritual Care Assessment/Progress Note  [address]      NAME: Cynthia Bradshaw      MRN: [id number]  AGE: 77 y.o. SEX: female  Yazidi Affiliation: Other   Language: English     3/28/2021     Total Time (in minutes): 10     Spiritual Assessment begun in [facility] 5M1 MED SURG 1 through conversation with:         []Patient        [] Family    [] Friend(s)        Reason for Consult: Palliative Care, Initial/Spiritual Assessment     Spiritual beliefs: (Please include comment if needed)     [] Identifies with a herman tradition:         [] Supported by a herman community:            [] Claims no spiritual orientation:           [] Seeking spiritual identity:                [] Adheres to an individual form of spirituality:           [x] Not able to assess:                           Identified resources for coping:      [] Prayer                               [] Music                  [] Guided Imagery     [] Family/friends                 [] Pet visits     [] Devotional reading                         [x] Unknown     [] Other:                                               Interventions offered during this visit: (See comments for more details)    Patient Interventions: Initial visit(Attempted)           Plan of Care:     [] Support spiritual and/or cultural needs    [] Support AMD and/or advance care planning process      [] Support grieving process   [] Coordinate Rites and/or Rituals    [] Coordination with community clergy   [] No spiritual needs identified at this time   [] Detailed Plan of Care below (See Comments)  [] Make referral to Music Therapy  [] Make referral to Pet Therapy     [] Make referral to Addiction services  [] Make referral to [facility]  [] Make referral to Spiritual Care Partner  [] No future visits requested        [x] Follow up upon further referrals     Comments:  visit for initial spiritual assessment, palliative care consult.   Nursing staff with patient providing care at this time, door closed for privacy. Please contact spiritual care for further referral or consult. Rev.  Sarita Granado, Adriana, [facility], [facility]   paging service: [phone])

## 2021-03-28 NOTE — PROGRESS NOTES
MRI w/o e/o CVA but met noted. Will d/c neurology consult. Oncology already consulted.  No e/o edema to warrant steroids at this time

## 2021-03-28 NOTE — PROGRESS NOTES
Problem: Falls - Risk of  Goal: *Absence of Falls  Outcome: Progressing Towards Goal  Note: Fall Risk Interventions:                                Problem: Patient Education: Go to Patient Education Activity  Goal: Patient/Family Education  Outcome: Progressing Towards Goal     Problem: Impaired Skin Integrity/Pressure Injury Treatment  Goal: *Improvement of Existing Pressure Injury  Outcome: Progressing Towards Goal  Goal: *Prevention of pressure injury  Outcome: Progressing Towards Goal  Note: Pressure Injury Interventions:                                            Problem: Impaired Skin Integrity/Pressure Injury Treatment  Goal: *Improvement of Existing Pressure Injury  Outcome: Progressing Towards Goal  Goal: *Prevention of pressure injury  Outcome: Progressing Towards Goal  Note: Pressure Injury Interventions:                                            Problem: Impaired Skin Integrity/Pressure Injury Treatment  Goal: *Prevention of pressure injury  Outcome: Progressing Towards Goal  Note: Pressure Injury Interventions:                                            Problem: Patient Education: Go to Patient Education Activity  Goal: Patient/Family Education  Outcome: Progressing Towards Goal     Problem: Breathing Pattern - Ineffective  Goal: *Absence of hypoxia  Outcome: Progressing Towards Goal  Goal: *Use of effective breathing techniques  Outcome: Progressing Towards Goal  Goal: *PALLIATIVE CARE:  Alleviation of Dyspnea  Outcome: Progressing Towards Goal     Problem: Patient Education: Go to Patient Education Activity  Goal: Patient/Family Education  Outcome: Progressing Towards Goal

## 2021-03-28 NOTE — PROGRESS NOTES
[address] Adult  Hospitalist Group                                                                                          Hospitalist Progress Note  Eufemia Marc MD        Date of Service:  3/28/2021  NAME:  Bev Hassan  :  1945  MRN:  [id number]      Admission Summary:   78 yo female presented to the Ed with left hand numbness, she was found to have metastatic cancer, unclear primary. Interval history / Subjective:    feels weak, otherwise no complaints. Assessment & Plan:     Metastatic cancer  -primary unclear, though suspecting lung  -consult heme/onc, will likely need biopsies  -MRI brain shows met in the left cerebellar hemisphere  -palliative consulted to help with goals of care    Numbness/tingling of left hand  -likely to be 2/2 carpal tunnel, symptoms not consistent with location of brain mets on MRI  -PT to eval, may benefit from bracing    HLD  -start statin,     HTN  -cont losartan      Code status: full  DVT [facility]       Hospital Problems  Date Reviewed: 10/11/2013          Codes Class Noted POA    Metastatic cancer ([facility] [address] ICD-10-CM: C79.9  ICD-9-CM: 199.1  3/27/2021 Yes        Hypertension ICD-10-CM: I10  ICD-9-CM: 401.9  3/27/2021 Unknown        Hyperlipidemia ICD-10-CM: E78.5  ICD-9-CM: 272.4  3/27/2021 Unknown        Numbness and tingling of left hand ICD-10-CM: R20.0, R20.2  ICD-9-CM: 782.0  3/27/2021 Yes    Overview Signed 3/27/2021  6:26 PM by Reece Hurtado MD     Likely carpal tunnel                     Review of Systems:   A comprehensive review of systems was negative except for that written in the HPI. Vital Signs:    Last 24hrs VS reviewed since prior progress note.  Most recent are:  Visit Vitals  BP (!) 158/82 (BP 1 Location: Left upper arm, BP Patient Position: At rest)   Pulse 89   Temp 98.3 °F (36.8 °C)   Resp 16   Ht 5' 3\" (1.6 m)   Wt 45.7 kg (100 lb 12 oz)   SpO2 95%   BMI 17.85 kg/m²         Intake/Output Summary (Last 24 hours) at 3/28/2021 075  Last data filed at 3/28/2021 0445  Gross per 24 hour   Intake 500 ml   Output 250 ml   Net 250 ml        Physical Examination:             Constitutional:  No acute distress, cooperative, pleasant    ENT:  Oral mucosa moist, oropharynx benign. Resp:  CTA bilaterally. No wheezing/rhonchi/rales. No accessory muscle use   CV:  Regular rhythm, normal rate, no murmurs, gallops, rubs    GI:  Soft, non distended, non tender. normoactive bowel sounds, no hepatosplenomegaly     Musculoskeletal:  No edema, warm, 2+ pulses throughout    Neurologic:  Moves all extremities. AAOx3, CN II-XII reviewed     Psych:  Good insight, Not anxious nor agitated. Data Review:    Review and/or order of clinical lab test      Labs:     Recent Labs     03/28/21  0018 03/27/21  1443   WBC 8.0 8.4   HGB 11.6 12.2   HCT 35.2 36.0    318     Recent Labs     03/28/21  0018 03/27/21  1443    136   K 3.6 3.5    100   CO2 29 29   BUN 13 10   CREA 0.60 0.57   * 96   CA 8.9 9.2   MG 2.1  --      Recent Labs     03/27/21  1830 03/27/21  1443   ALT  --  30   AP  --  175*   TBILI  --  0.5   TP  --  7.5   ALB  --  3.2*   GLOB  --  4.3*   LPSE 59*  --      Recent Labs     03/27/21  1443   INR 1.1   PTP 11.2*   APTT 24.8      No results for input(s): FE, TIBC, PSAT, FERR in the last 72 hours. No results found for: FOL, RBCF   No results for input(s): PH, PCO2, PO2 in the last 72 hours.   Recent Labs     03/28/21  0640 03/28/21  0018 03/27/21 1830   TROIQ <0.05 <0.05 <0.05     Lab Results   Component Value Date/Time    Cholesterol, total 224 (H) 03/27/2021 02:43 PM    HDL Cholesterol 55 03/27/2021 02:43 PM    LDL, calculated 156.2 (H) 03/27/2021 02:43 PM    Triglyceride 64 03/27/2021 02:43 PM    CHOL/HDL Ratio 4.1 03/27/2021 02:43 PM     Lab Results   Component Value Date/Time    Glucose (POC) 103 (H) 03/27/2021 11:44 AM     Lab Results   Component Value Date/Time    Color YELLOW 01/17/2012 01:57 PM    Appearance CLEAR 01/17/2012 01:57 PM    Specific gravity 1.025 01/17/2012 01:57 PM    pH (UA) 6.0 01/17/2012 01:57 PM    Protein TRACE (A) 01/17/2012 01:57 PM    Glucose NEGATIVE  01/17/2012 01:57 PM    Ketone NEGATIVE  01/17/2012 01:57 PM    Bilirubin NEGATIVE  01/17/2012 01:57 PM    Urobilinogen 0.2 01/17/2012 01:57 PM    Nitrites NEGATIVE  01/17/2012 01:57 PM    Leukocyte Esterase TRACE (A) 01/17/2012 01:57 PM    Epithelial cells 0-5 01/17/2012 01:57 PM    Bacteria NEGATIVE  01/17/2012 01:57 PM    WBC 0-4 01/17/2012 01:57 PM    RBC 0-3 01/17/2012 01:57 PM         Medications Reviewed:     Current Facility-Administered Medications   Medication Dose Route Frequency    hydrALAZINE (APRESOLINE) 20 mg/mL injection 20 mg  20 mg IntraVENous Q6H PRN    sodium chloride (NS) flush 5-40 mL  5-40 mL IntraVENous Q8H    sodium chloride (NS) flush 5-40 mL  5-40 mL IntraVENous PRN    acetaminophen (TYLENOL) tablet 650 mg  650 mg Oral Q4H PRN    naloxone (NARCAN) injection 0.4 mg  0.4 mg IntraVENous PRN    ondansetron (ZOFRAN) injection 4 mg  4 mg IntraVENous Q4H PRN    losartan (COZAAR) tablet 50 mg  50 mg Oral DAILY    atorvastatin (LIPITOR) tablet 40 mg  40 mg Oral QHS    simethicone (MYLICON) tablet 80 mg  80 mg Oral QID PRN     ______________________________________________________________________  EXPECTED LENGTH OF STAY: - - -  ACTUAL LENGTH OF STAY:          1                 Delmer Schwartz MD

## 2021-03-28 NOTE — CONSULTS
[address] [facility] [address], [facility], [address] [facility], [address] [place] [address]  [phone]        Brief Note      Consult Received  Likely Lung cancer with metastasis to the brain and liver. Full note to follow.        Signed by: Janene Hyman MD                     March 28, 2021

## 2021-03-28 NOTE — PROGRESS NOTES
Bedside and Verbal shift change report given to Delmi Ram RN (oncoming nurse) by Araceli Morton RN (offgoing nurse). Report included the following information SBAR, Kardex, Intake/Output, MAR, Accordion and Recent Results.

## 2021-03-28 NOTE — PROGRESS NOTES
3/28/2021  Reason for Admission:  Metastatic cancer       RUR Score:          7% low           Plan for utilizing home health:  Unsure at this time, patient has never used before. PCP: First and Last name:  Dr. Orlando Arthur (likely Ravindra Tracey MD @ [address] [place] [address])   Name of Practice:    Are you a current patient: Yes/No: Yes   Approximate date of last visit: Patient has not seen this PCP yet, just changed insurances and will see him to follow up after discharge. Can you participate in a virtual visit with your PCP:  Yes                    Current Advanced Directive/Advance Care Plan: Full Code    Healthcare Decision Maker:   Click here to complete [address] including selection of the Healthcare Decision Maker Relationship (ie \"Primary\")           NOSTEVAN is sangita Lomax                  Transition of Care Plan:                    I completed the assessment with the patient in the room, I wore a mask. Patient lives with her niece in a 2 story home with no steps to enter. Prior to admission she states she is independent with ADLs and drives. She has never used home health in the past and does not use any DME that she reports. Her son Georgiana is her emergency contact and can be reached at [phone]. Either he or her niece Dory Duke will transport her home when she is ready to leave. Her primary care doctor she reports is Dr. Merlin Sprague, who is (after some research) likely Dr. Ravindra Tracey. She has not seen him yet as she just changed insurance policies but says she will follow up with him after she leaves here. She uses [facility] for prescriptions when she needs them and does not report any issues with cost. No CM needs noted at this time. Transition of Care Plan  1. Continue medical management/treatment  2. Unsure of discharge needs d/t metastatic cancer  3. Family will transport at discharge  4. Follow up with PCP, specialties as necessary   5.  CM will continue to follow and assist with discharge planning    Care Management Interventions  PCP Verified by CM: Yes(Dr. Vane Casper (?))  Mode of Transport at Discharge:  Other (see comment)(Family)  Transition of Care Consult (CM Consult): Discharge Planning  Current Support Network: Family Lives [place], [place] with niece)  Confirm Follow Up Transport: Family  Discharge Location  Discharge Placement: Home with family assistance    Natacha Weinberg, [facility]

## 2021-03-28 NOTE — ROUTINE PROCESS
Bedside and Verbal shift change report given to 23 Apryl Adam (oncoming nurse) by Isis Francois (offgoing nurse). Report included the following information SBAR and Kardex.

## 2021-03-29 ENCOUNTER — HOSPITAL ENCOUNTER (OUTPATIENT)
Dept: ULTRASOUND IMAGING | Age: 76
Discharge: HOME | End: 2021-03-29
Attending: INTERNAL MEDICINE
Payer: MEDICARE

## 2021-03-29 LAB
ANION GAP SERPL CALC-SCNC: 5 MMOL/L (ref 5–15)
ATRIAL RATE: 100 BPM
ATRIAL RATE: 85 BPM
BASOPHILS # BLD: 0 K/UL (ref 0–0.1)
BASOPHILS NFR BLD: 1 % (ref 0–1)
BUN SERPL-MCNC: 11 MG/DL (ref 6–20)
BUN/CREAT SERPL: 20 (ref 12–20)
CALCIUM SERPL-MCNC: 9.2 MG/DL (ref 8.5–10.1)
CALCULATED P AXIS, ECG09: 56 DEGREES
CALCULATED P AXIS, ECG09: 70 DEGREES
CALCULATED R AXIS, ECG10: 33 DEGREES
CALCULATED R AXIS, ECG10: 42 DEGREES
CALCULATED T AXIS, ECG11: 55 DEGREES
CALCULATED T AXIS, ECG11: 78 DEGREES
CHLORIDE SERPL-SCNC: 105 MMOL/L (ref 97–108)
CO2 SERPL-SCNC: 29 MMOL/L (ref 21–32)
CREAT SERPL-MCNC: 0.54 MG/DL (ref 0.55–1.02)
DIAGNOSIS, 93000: NORMAL
DIAGNOSIS, 93000: NORMAL
DIFFERENTIAL METHOD BLD: NORMAL
EOSINOPHIL # BLD: 0 K/UL (ref 0–0.4)
EOSINOPHIL NFR BLD: 1 % (ref 0–7)
ERYTHROCYTE [DISTWIDTH] IN BLOOD BY AUTOMATED COUNT: 13.5 % (ref 11.5–14.5)
GLUCOSE SERPL-MCNC: 81 MG/DL (ref 65–100)
HCT VFR BLD AUTO: 37.2 % (ref 35–47)
HGB BLD-MCNC: 12.1 G/DL (ref 11.5–16)
IMM GRANULOCYTES # BLD AUTO: 0 K/UL (ref 0–0.04)
IMM GRANULOCYTES NFR BLD AUTO: 0 % (ref 0–0.5)
LYMPHOCYTES # BLD: 2 K/UL (ref 0.8–3.5)
LYMPHOCYTES NFR BLD: 26 % (ref 12–49)
MCH RBC QN AUTO: 29.6 PG (ref 26–34)
MCHC RBC AUTO-ENTMCNC: 32.5 G/DL (ref 30–36.5)
MCV RBC AUTO: 91 FL (ref 80–99)
MONOCYTES # BLD: 0.7 K/UL (ref 0–1)
MONOCYTES NFR BLD: 10 % (ref 5–13)
NEUTS SEG # BLD: 4.8 K/UL (ref 1.8–8)
NEUTS SEG NFR BLD: 62 % (ref 32–75)
NRBC # BLD: 0 K/UL (ref 0–0.01)
NRBC BLD-RTO: 0 PER 100 WBC
P-R INTERVAL, ECG05: 120 MS
P-R INTERVAL, ECG05: 122 MS
PLATELET # BLD AUTO: 279 K/UL (ref 150–400)
PMV BLD AUTO: 9.3 FL (ref 8.9–12.9)
POTASSIUM SERPL-SCNC: 3.5 MMOL/L (ref 3.5–5.1)
Q-T INTERVAL, ECG07: 356 MS
Q-T INTERVAL, ECG07: 384 MS
QRS DURATION, ECG06: 82 MS
QRS DURATION, ECG06: 86 MS
QTC CALCULATION (BEZET), ECG08: 456 MS
QTC CALCULATION (BEZET), ECG08: 459 MS
RBC # BLD AUTO: 4.09 M/UL (ref 3.8–5.2)
SODIUM SERPL-SCNC: 139 MMOL/L (ref 136–145)
VENTRICULAR RATE, ECG03: 100 BPM
VENTRICULAR RATE, ECG03: 85 BPM
WBC # BLD AUTO: 7.7 K/UL (ref 3.6–11)

## 2021-03-29 PROCEDURE — 96376 TX/PRO/DX INJ SAME DRUG ADON: CPT

## 2021-03-29 PROCEDURE — 93005 ELECTROCARDIOGRAM TRACING: CPT

## 2021-03-29 PROCEDURE — 88333 PATH CONSLTJ SURG CYTO XM 1: CPT

## 2021-03-29 PROCEDURE — 76942 ECHO GUIDE FOR BIOPSY: CPT

## 2021-03-29 PROCEDURE — 74011250636 HC RX REV CODE- 250/636: Performed by: RADIOLOGY

## 2021-03-29 PROCEDURE — 85025 COMPLETE CBC W/AUTO DIFF WBC: CPT

## 2021-03-29 PROCEDURE — 99218 HC RM OBSERVATION: CPT

## 2021-03-29 PROCEDURE — 88360 TUMOR IMMUNOHISTOCHEM/MANUAL: CPT

## 2021-03-29 PROCEDURE — 77030014115

## 2021-03-29 PROCEDURE — 74011250636 HC RX REV CODE- 250/636: Performed by: INTERNAL MEDICINE

## 2021-03-29 PROCEDURE — 74011250637 HC RX REV CODE- 250/637: Performed by: INTERNAL MEDICINE

## 2021-03-29 PROCEDURE — 88342 IMHCHEM/IMCYTCHM 1ST ANTB: CPT

## 2021-03-29 PROCEDURE — 88307 TISSUE EXAM BY PATHOLOGIST: CPT

## 2021-03-29 PROCEDURE — 65270000029 HC RM PRIVATE

## 2021-03-29 PROCEDURE — 88341 IMHCHEM/IMCYTCHM EA ADD ANTB: CPT

## 2021-03-29 PROCEDURE — 99232 SBSQ HOSP IP/OBS MODERATE 35: CPT | Performed by: INTERNAL MEDICINE

## 2021-03-29 PROCEDURE — 36415 COLL VENOUS BLD VENIPUNCTURE: CPT

## 2021-03-29 PROCEDURE — 99152 MOD SED SAME PHYS/QHP 5/>YRS: CPT

## 2021-03-29 PROCEDURE — 80048 BASIC METABOLIC PNL TOTAL CA: CPT

## 2021-03-29 PROCEDURE — 77030008398

## 2021-03-29 PROCEDURE — 77030003503 HC NDL BIOP TISS BD -B

## 2021-03-29 RX ORDER — MIDAZOLAM HYDROCHLORIDE 1 MG/ML
5 INJECTION, SOLUTION INTRAMUSCULAR; INTRAVENOUS
Status: DISCONTINUED | OUTPATIENT
Start: 2021-03-29 | End: 2021-03-29

## 2021-03-29 RX ORDER — FENTANYL CITRATE 50 UG/ML
100 INJECTION, SOLUTION INTRAMUSCULAR; INTRAVENOUS
Status: DISCONTINUED | OUTPATIENT
Start: 2021-03-29 | End: 2021-03-29

## 2021-03-29 RX ORDER — LIDOCAINE HYDROCHLORIDE 10 MG/ML
10 INJECTION, SOLUTION EPIDURAL; INFILTRATION; INTRACAUDAL; PERINEURAL
Status: COMPLETED | OUTPATIENT
Start: 2021-03-29 | End: 2021-03-29

## 2021-03-29 RX ADMIN — HYDRALAZINE HYDROCHLORIDE 20 MG: 20 INJECTION INTRAMUSCULAR; INTRAVENOUS at 03:45

## 2021-03-29 RX ADMIN — LIDOCAINE HYDROCHLORIDE 10 ML: 10 INJECTION, SOLUTION EPIDURAL; INFILTRATION; INTRACAUDAL; PERINEURAL at 11:15

## 2021-03-29 RX ADMIN — Medication 10 ML: at 19:52

## 2021-03-29 RX ADMIN — LOSARTAN POTASSIUM 50 MG: 50 TABLET, FILM COATED ORAL at 16:32

## 2021-03-29 RX ADMIN — Medication 10 ML: at 16:31

## 2021-03-29 RX ADMIN — MIDAZOLAM HYDROCHLORIDE 0.5 MG: 1 INJECTION, SOLUTION INTRAMUSCULAR; INTRAVENOUS at 11:11

## 2021-03-29 RX ADMIN — FENTANYL CITRATE 12.5 MCG: 50 INJECTION, SOLUTION INTRAMUSCULAR; INTRAVENOUS at 11:11

## 2021-03-29 RX ADMIN — MIDAZOLAM HYDROCHLORIDE 1 MG: 1 INJECTION, SOLUTION INTRAMUSCULAR; INTRAVENOUS at 11:02

## 2021-03-29 RX ADMIN — FENTANYL CITRATE 25 MCG: 50 INJECTION, SOLUTION INTRAMUSCULAR; INTRAVENOUS at 11:02

## 2021-03-29 RX ADMIN — ATORVASTATIN CALCIUM 40 MG: 20 TABLET, FILM COATED ORAL at 19:52

## 2021-03-29 NOTE — PROGRESS NOTES
Physical therapy    7411 Pt received in bed, reporting she has not had anything to eat since her test earlier today and declining to participate with OOB activity at this time. Issued L nichol splint. RN notified    Sanjiv Saavedra. Sandi Borrego

## 2021-03-29 NOTE — CONSULTS
Interim Neurosurgery Note:  History and Scans reviewed . Patient seen by Dr. Major Friedman. Case also discussed with Dr Marco Morillo. We will address the small Left cerebellar lesion after discharge for possible Radiosurgery.    Thank you,   Basim Uribe MD.

## 2021-03-29 NOTE — PROGRESS NOTES
[address] [facility] Oncology at [facility]  [phone]    Hematology / Oncology Follow-up        Patient: Leonardo Tony MRN: [id number]  SSN: [id number]    YOB: 1945  Age: 77 y.o. Sex: female      Subjective:      Leonardo Tony is a 77 y.o. female who is admitted from the ED for weakness and dyspnea. She has lost over 30 lbs in the last 6 months. Dyspnea is slowly progressive. She has a history of cigarette smoking. She denies headache or bone pains. Interval History: Tolerated liver bx today. Has had some numbness to left hand but today not experiencing any. Reports 30# over the last 6 months. Has problems with constipation. Had some type of cancer approx 3 yrs ago and had surgery; possibly colon; did not receive chemo or XRT following surgery; surgery at [facility] with Dr Lisa Argueta Hx of cancer  None known    ; 2 sons; 1 local and 1 in [place]  Retired . Smoking hx: denies   ETOH: denies    Lives with niece    Addendum of note: received records from Dr Yoli Jacobs dated 3/22/2018;  Reports pt initially seen 1 yr prior at [facility] with near obstructing lesion in her sigmoid colon with biopsy positive for adenocarcinoma. She opted not to proceed with surgery at that time. She presented to the ED approx 1 1/2 ago with similar symptoms; was scheduled for OR but after having a BM declined operation. Now presenting again with abd distention, pain and constipation. 3/36/2018 underwent lap sigmoid colectomy for obstructing colon carcinoma by Dr Yoli Jacobs  According to Dr Orlin Gastelum office staff; pt had not been seen in the clinic     Past Medical History:   Diagnosis Date    Hypertension      No past surgical history on file.    Family History   Problem Relation Age of Onset    Asthma Mother     Heart Disease Father      Social History     Tobacco Use    Smoking status: Never Smoker   Substance Use Topics    Alcohol use: No      Prior to Admission medications    Medication Sig Start Date End Date Taking? Authorizing Provider   losartan (COZAAR) 50 mg tablet Take 50 mg by mouth daily. Yes Provider, Historical              Allergies   Allergen Reactions    Aspirin Other (comments)     Eyes swelling. Objective:     Vitals:    03/29/21 1200 03/29/21 1215 03/29/21 1222 03/29/21 1259   BP: (!) 152/88 (!) 154/98  (!) 153/84   Pulse: 93 93  92   Resp: 20 15  16   Temp:    97.6 °F (36.4 °C)   SpO2: 97% 97%  97%   Weight:       Height:   5' 3\" (1.6 m)       General: frail, no distress  Eyes: anicteric sclerae  HENT: atraumatic  Neck: supple  Respiratory: decreased breath sound on left; no wheezing or rhonchi noted; normal respiratory effort  CV: no peripheral edema  GI: soft, nontender, nondistended, no masses, no hepatomegaly, no splenomegaly  Skin: no rashes; no ecchymoses; no petechiae  Psych: alert, oriented, appropriate affect, fair judgment/insight      Lab Results   Component Value Date/Time    WBC 7.7 03/29/2021 02:27 AM    HGB 12.1 03/29/2021 02:27 AM    HCT 37.2 03/29/2021 02:27 AM    PLATELET 890 81/82/8394 02:27 AM    MCV 91.0 03/29/2021 02:27 AM         Lab Results   Component Value Date/Time    Sodium 139 03/29/2021 02:27 AM    Potassium 3.5 03/29/2021 02:27 AM    Chloride 105 03/29/2021 02:27 AM    CO2 29 03/29/2021 02:27 AM    Anion gap 5 03/29/2021 02:27 AM    Glucose 81 03/29/2021 02:27 AM    BUN 11 03/29/2021 02:27 AM    Creatinine 0.54 (L) 03/29/2021 02:27 AM    BUN/Creatinine ratio 20 03/29/2021 02:27 AM    GFR est AA >60 03/29/2021 02:27 AM    GFR est non-AA >60 03/29/2021 02:27 AM    Calcium 9.2 03/29/2021 02:27 AM    Bilirubin, total 0.5 03/27/2021 02:43 PM    Alk.  phosphatase 175 (H) 03/27/2021 02:43 PM    Protein, total 7.5 03/27/2021 02:43 PM    Albumin 3.2 (L) 03/27/2021 02:43 PM    Globulin 4.3 (H) 03/27/2021 02:43 PM    A-G Ratio 0.7 (L) 03/27/2021 02:43 PM    ALT (SGPT) 30 03/27/2021 02:43 PM    AST (SGOT) 41 (H) 03/27/2021 02:43 PM         CT Results (most recent):  Results from [place] encounter on 03/27/21   CT ABD PELV W CONT    Narrative EXAM:  CT CHEST W CONT, CT ABD PELV W CONT    INDICATION: Abnormal CXR, 30+ lb weight loss over past 6 months, chronic cough    COMPARISON: None. CONTRAST:  100 mL of Isovue-370. TECHNIQUE:   Following the uneventful intravenous administration of contrast, thin axial  images were obtained through the chest, abdomen and pelvis. Coronal and sagittal  reconstructions were generated. Oral contrast was not administered. CT dose  reduction was achieved through use of a standardized protocol tailored for this  examination and automatic exposure control for dose modulation. FINDINGS:   Chest:  Lungs/Pleura: There is complete atelectasis of the left lower lobe with a mass  in the left lower lobe infrahilar region obstructing the left lower lobe  bronchus, which appears to measure approximately 5.6 x 4.5 cm. There are  innumerable other pulmonary nodules/masses, largest examples as below:  Right upper lobe mass measuring 2.8 x 2.5 cm (series 4 image 17). Right lower lobe mass measuring 3.5 x 3.2 cm (series 4 image 32). Right middle lobe mass measuring 2.3 x 1.6 and meter (series 4 image 35). Left upper 1lobe mass measuring 4.6 x 4.0 cm (series 4 image 627). Left upper lobe mass measuring 2.8 x 2.5 cm (series 4 image 12). Small left pleural effusion. Axilla/Soft Tissue: No pathologic axillary adenopathy. Multinodular thyroid  gland, largest nodule in the right thyroid lobe measuring 16 mm. Mediastinum: Cardiomegaly. Trace pericardial effusion. No pathologic mediastinal  or hilar adenopathy. Bones: No evidence of acute fracture, dislocation, or aggressive osseous  abnormality.     Abdomen/Pelvis:  Liver:  Numerous heterogeneous enhancing, hypodense hepatic lesions, consistent  with metastases, largest examples as below:  Segment 2/3 metastasis measuring 7.4 x 5.5 cm (series 8 image 19). Segment 2/3 metastasis measuring 4.3 x 4.2 cm (series 8 image 27). Segment 4 metastasis measuring 4.8 x 4.0 cm (series 8 image 28). Segment 8 metastasis measuring 3.5 x 2.9 cm (series 8 image 21). Biliary system: Gallbladder is markable. No intrahepatic or extrahepatic biliary  ductal dilatation. Spleen: Normal.    Pancreas: Normal.    Kidneys/Ureters/Bladder: Indeterminant hypodense lesion in the lower pole the  right kidney measuring 1.4 x 1.2 cm (series 610 image 41). There are multiple  other simple cysts in the kidneys, largest in the lower pole the right kidney  measuring 2.5 cm, which do not require follow-up. No renal or ureteral calculi. No hydronephrosis or hydroureter. The bladder is normal.    Adrenals: Normal.    Stomach/bowel: No dilation or abnormal wall thickening is present. No free  intraperitoneal air noted. Reproductive Organs: Uterus is present. No suspicious adnexal masses. Vasculature: Normal caliber arteries. Moderate calcific atherosclerosis of the  abdominal aorta and iliac vasculature. Portal vein, SMV, and splenic vein are  patent. Nodes: No pathologically enlarged lymph nodes. Fluid: Trace ascites. Bones/Soft Tissue: No acute fractures or aggressive osseous lesions are seen. Multilevel degenerative disc disease throughout the lumbar spine. Impression Chest:  1. Left lower lobe infrahilar mass measuring 5.6 x 4.5 cm, with obstruction of  the left lower lobe bronchus and complete atelectasis of the left lower lobe. This may represent primary lung malignancy. 2. Innumerable metastatic pulmonary masses and nodules throughout the lungs,  largest examples as given above. 3. Small left pleural effusion. Abdomen/pelvis:  1. Numerous large hepatic metastases. 2. Indeterminate hypodense lesion in the lower pole of the right kidney  measuring 1.4 x 1.2 cm. This can be further evaluated with CT or MRI renal  protocol. Assessment/ Plan     1. ? 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 Brain mets  Single metastatic focus in left cerebellar hemisphere noted on brain MRI  - Rad-Onc consulted: reviewed with Dr Dino Mitchell;  plans for [facility] knife in the future with Dr Anahy Okeefe as outpatient Will work on establishing follow up for pt at discharge      3. Severe protein calorie malnutrition  Protein supplementation   Dietician consultation    4. Debility  PT eval      5.  Hx of colon cancer  See summary of notes obtained from Dr Eliezer Jean as above  3/26/2018 S/p lap sigmoid colectomy    Plan reviewed with Dr Tyree Ribeiro By: Soniya Paz NP     March 29, 2021

## 2021-03-29 NOTE — CONSULTS
Comprehensive Nutrition Assessment    Type and Reason for Visit: Initial, Consult    Nutrition Recommendations/Plan:   1. Advance diet after procedure to regular diet. 2. Once diet advanced, add strawberry Ensure Enlive BID to promote intake and weight maintenance/gain. Nutrition Assessment:      3/29: 78 y/o female admitted with mets CA. Per MD note, pt with lung CA with mets to brain. NPO for liver bx today. Spoke with pt in room. Pt reports decreased appetite and 30# wt loss x6 months. Says she gets full very quickly. Has been trying to eat two full meals per day + snacks. Has also been using Boost 1x/day x3-4 months. C/o n/v/d since 3/26, says this is better now. Pt agreeable to receiving Ensure while here once diet advanced. May need education on maximizing nutrition when diet advanced. Labs and meds reviewed. Malnutrition Assessment:  Malnutrition Status:  Severe malnutrition    Context:  Chronic illness     Findings of the 6 clinical characteristics of malnutrition:   Energy Intake:  7 - 75% or less est energy requirements for 1 month or longer  Weight Loss:  7.00 - Greater than 10% over 6 months     Body Fat Loss:  7 - Severe body fat loss, Fat overlying ribs   Muscle Mass Loss:  1 - Mild muscle mass loss, Temples (temporalis)  Fluid Accumulation:  No significant fluid accumulation,     Strength:  Not performed     Estimated Daily Nutrient Needs:  Energy (kcal): 1453(926 x 1.3 AF (+250)); Weight Used for Energy Requirements: Current  Protein (g): 63(1.2-1.5 g/kg IBW);  Weight Used for Protein Requirements: Ideal  Fluid (ml/day): 1453; Method Used for Fluid Requirements: 1 ml/kcal    Nutrition Related Findings:  last BM 3/26      Wounds:    None       Current Nutrition Therapies:  DIET NPO    Anthropometric Measures:  · Height:  5' 3\" (160 cm)  · Current Body Wt:  45.7 kg (100 lb 12 oz)   · Admission Body Wt:       · Usual Body Wt:        · Ideal Body Wt:  115 lbs:  87.6 %   · Adjusted Body Weight:   ; Weight Adjustment for: No adjustment   · Adjusted BMI:       · BMI Category:  Underweight (BMI less than 22) age over 72       Nutrition Diagnosis:   · Unintended weight loss related to inadequate protein-energy intake as evidenced by weight loss greater than or equal to 10% in 6 months    Nutrition Interventions:   Food and/or Nutrient Delivery: Start oral diet, Start oral nutrition supplement  Nutrition Education and Counseling: Education needed  Coordination of Nutrition Care: No recommendation at this time    Goals:  Diet advancement with po intake >50% meals + ONS and weight maintenance/gain next 1-3 days       Nutrition Monitoring and Evaluation:   Behavioral-Environmental Outcomes: None identified  Food/Nutrient Intake Outcomes: Food and nutrient intake, Supplement intake  Physical Signs/Symptoms Outcomes: Weight, Biochemical data, GI status, Nausea/vomiting, Diarrhea, Nutrition focused physical findings    Discharge Planning:     Too soon to determine     Electronically signed by Leigh Mcmillan RDN on 3/29/2021 at 12:27 PM    Contact: [phone]

## 2021-03-29 NOTE — PROGRESS NOTES
56 Pt brought back to [facility] for US guided  liver  biopsy. Accompanied by son. Confirmed NPO. [facility] Dr Keyona Wang  in [facility] to discuss procedure and sedation plan with pt, son and RN. Allergies reviewed. Consent signed. 1057 Pt brought to US for procedure. Sedation started at 1102. Procedure started at [address] and ended at  1123. Pt received a total of  1.5mg of versed and  37.5mcg  of fentanyl over the course of procedure. Pt tolerated procedure well. . Denies pain. Dressing to procedure site CDI. VS monitored throughout procedure. 1128 [facility] spoke with Clifford Cui, RN. Pt doing well, asking to eat and drink. Clifford Cui will check on that. 1145 Pt resting comfortably with eyes closed. 1225 Pt transported back to room.

## 2021-03-29 NOTE — PROGRESS NOTES
Spiritual Care Assessment/Progress Note  [address]      NAME: Cem Morales      MRN: [id number]  AGE: 77 y.o. SEX: female  Denominational Affiliation: Other   Language: English     3/29/2021     Total Time (in minutes): 5     Spiritual Assessment begun in [facility] 5M1 MED SURG 1 through conversation with:         []Patient        [] Family    [] Friend(s)        Reason for Consult: Palliative Care, Initial/Spiritual Assessment     Spiritual beliefs: (Please include comment if needed)     [] Identifies with a herman tradition:         [] Supported by a herman community:            [] Claims no spiritual orientation:           [] Seeking spiritual identity:                [] Adheres to an individual form of spirituality:           [x] Not able to assess:                           Identified resources for coping:      [] Prayer                               [] Music                  [] Guided Imagery     [] Family/friends                 [] Pet visits     [] Devotional reading                         [x] Unknown     [] Other:                                              Interventions offered during this visit: (See comments for more details)    Patient Interventions: Initial visit(Attempted)           Plan of Care:     [] Support spiritual and/or cultural needs    [] Support AMD and/or advance care planning process      [] Support grieving process   [] Coordinate Rites and/or Rituals    [] Coordination with community clergy   [] No spiritual needs identified at this time   [] Detailed Plan of Care below (See Comments)  [] Make referral to Music Therapy  [] Make referral to Pet Therapy     [] Make referral to Addiction services  [] Make referral to [facility]  [] Make referral to Spiritual Care Partner  [] No future visits requested        [x] Follow up upon further referrals     Comments:  visit for initial spiritual assessment, palliative care consult.   Staff assisting patient onto margret transporting her for procedure. Please contact spiritual care for further referral or consult. Rev.  Shane Stoddard MDiv, [facility], [facility]   paging service: [phone])

## 2021-03-29 NOTE — PROGRESS NOTES
Pt's niece, Sourav Eldridge, requests for either herself or her son, Georgiana, to be contacted with the time of the procedure. Both contacts are in the chart. Will continue to monitor.

## 2021-03-29 NOTE — CONSULTS
Radiation Oncology Note    78 yo F admitted with dyspnea, weakness, and 30 lbs weight loss over 6 months. Imaging reviewed with appearance of metastatic lung cancer to contralateral lung, liver, and brain. Liver biopsy today to confirm, awaiting pathology. Brain MRI with a single, small left cerebellar lesion. Discussed with Dr. Hilda Wang and lesion amenable to stereotactic radiosurgery [facility]) with [employer]. Will address as an outpatient, assuming pathology returns positive. My department will arrange outpatient follow up. Full consult note at that time.   Thank you      Aisha Flanagan MD  [facility]  Radiation Oncology Department  [address] [place], [place] [address]in [facility]  [phone]

## 2021-03-29 NOTE — PROGRESS NOTES
Chart reviewed for Liver biopsy     Areas reviewed include, but are not limited to, patient's current and past H&P, Lab and Procedure Results, all notes, media records and medications. Patient should be kept NPO  Procedure to completed after noon due to outpatient radiology schedule. Please call  with questions or concerns.

## 2021-03-29 NOTE — PROGRESS NOTES
Bedside and Verbal shift change report given to Clifford Cui RN (oncoming nurse) by Derick Cotter RN (offgoing nurse). Report included the following information SBAR, Kardex, ED Summary, Procedure Summary, Intake/Output, MAR and Recent Results.

## 2021-03-29 NOTE — PROGRESS NOTES
[address]  Hospitalist Group                                                                                          Hospitalist Progress Note  Chantal Barthel, MD        Date of Service:  3/29/2021  NAME:  Mckayla Valentine  :  1945  MRN:  [id number]      Admission Summary:   76 yo female presented to the Ed with left hand numbness, she was found to have metastatic cancer, unclear primary. Interval history / Subjective:   Feel tired, no other complaints. Assessment & Plan:     Metastatic cancer  -primary unclear, though suspecting lung  -consult heme/onc, s/p liver biopsy  -MRI brain shows met in the left cerebellar hemisphere, neurosurgery plans possible radiosurgery post discharge  -palliative consulted to help with goals of care    Numbness/tingling of left hand  -likely to be 2/2 carpal tunnel, symptoms not consistent with location of brain mets on MRI  -PT to eval, may benefit from bracing    HLD  -start statin,     HTN  -cont losartan      Code status: full  DVT prophylaxis: SCDs       Hospital Problems  Date Reviewed: 10/11/2013          Codes Class Noted POA    Lung cancer metastatic to brain [facility]) ICD-10-CM: C34.90, C79.31  ICD-9-CM: 162.9, 198.3  3/28/2021 Unknown        Severe protein-calorie malnutrition Edda Winston: less than 60% of standard weight) ([facility] [address] ICD-10-CM: E43  ICD-9-CM: 809  3/28/2021 Unknown        Metastatic cancer ([facility] [address] ICD-10-CM: C79.9  ICD-9-CM: 199.1  3/27/2021 Yes        Hypertension ICD-10-CM: I10  ICD-9-CM: 401.9  3/27/2021 Unknown        Hyperlipidemia ICD-10-CM: E78.5  ICD-9-CM: 272.4  3/27/2021 Unknown        Numbness and tingling of left hand ICD-10-CM: R20.0, R20.2  ICD-9-CM: 782.0  3/27/2021 Yes    Overview Signed 3/27/2021  6:26 PM by Chencho Perez MD     Likely carpal tunnel                     Review of Systems:   A comprehensive review of systems was negative except for that written in the HPI.        Vital Signs:    Last 24hrs VS reviewed since prior progress note. Most recent are:  Visit Vitals  BP (!) 153/84 (BP 1 Location: Right upper arm, BP Patient Position: At rest)   Pulse 92   Temp 97.6 °F (36.4 °C)   Resp 16   Ht 5' 3\" (1.6 m)   Wt 45.7 kg (100 lb 12 oz)   SpO2 97%   BMI 17.85 kg/m²         Intake/Output Summary (Last 24 hours) at 3/29/2021 1342  Last data filed at 3/28/2021 1850  Gross per 24 hour   Intake 240 ml   Output    Net 240 ml        Physical Examination:             Constitutional:  No acute distress, cooperative, pleasant    ENT:  Oral mucosa moist, oropharynx benign. Resp:  CTA bilaterally. No wheezing/rhonchi/rales. No accessory muscle use   CV:  Regular rhythm, normal rate, no murmurs, gallops, rubs    GI:  Soft, non distended, non tender. normoactive bowel sounds, no hepatosplenomegaly     Musculoskeletal:  No edema, warm, 2+ pulses throughout    Neurologic:  Moves all extremities. AAOx3, CN II-XII reviewed     Psych:  Good insight, Not anxious nor agitated. Data Review:    Review and/or order of clinical lab test      Labs:     Recent Labs     03/29/21 0227 03/28/21  0018   WBC 7.7 8.0   HGB 12.1 11.6   HCT 37.2 35.2    330     Recent Labs     03/29/21 0227 03/28/21  0018 03/27/21  1443    138 136   K 3.5 3.6 3.5    103 100   CO2 29 29 29   BUN 11 13 10   CREA 0.54* 0.60 0.57   GLU 81 105* 96   CA 9.2 8.9 9.2   MG  --  2.1  --      Recent Labs     03/27/21  1830 03/27/21  1443   ALT  --  30   AP  --  175*   TBILI  --  0.5   TP  --  7.5   ALB  --  3.2*   GLOB  --  4.3*   LPSE 59*  --      Recent Labs     03/27/21  1443   INR 1.1   PTP 11.2*   APTT 24.8      No results for input(s): FE, TIBC, PSAT, FERR in the last 72 hours. No results found for: FOL, RBCF   No results for input(s): PH, PCO2, PO2 in the last 72 hours.   Recent Labs     03/28/21  0640 03/28/21  0018 03/27/21  1830   TROIQ <0.05 <0.05 <0.05     Lab Results   Component Value Date/Time    Cholesterol, total 224 (H) 03/27/2021 02:43 PM    HDL Cholesterol 55 03/27/2021 02:43 PM    LDL, calculated 156.2 (H) 03/27/2021 02:43 PM    Triglyceride 64 03/27/2021 02:43 PM    CHOL/HDL Ratio 4.1 03/27/2021 02:43 PM     Lab Results   Component Value Date/Time    Glucose (POC) 103 (H) 03/27/2021 11:44 AM     Lab Results   Component Value Date/Time    Color YELLOW 01/17/2012 01:57 PM    Appearance CLEAR 01/17/2012 01:57 PM    Specific gravity 1.025 01/17/2012 01:57 PM    pH (UA) 6.0 01/17/2012 01:57 PM    Protein TRACE (A) 01/17/2012 01:57 PM    Glucose NEGATIVE  01/17/2012 01:57 PM    Ketone NEGATIVE  01/17/2012 01:57 PM    Bilirubin NEGATIVE  01/17/2012 01:57 PM    Urobilinogen 0.2 01/17/2012 01:57 PM    Nitrites NEGATIVE  01/17/2012 01:57 PM    Leukocyte Esterase TRACE (A) 01/17/2012 01:57 PM    Epithelial cells 0-5 01/17/2012 01:57 PM    Bacteria NEGATIVE  01/17/2012 01:57 PM    WBC 0-4 01/17/2012 01:57 PM    RBC 0-3 01/17/2012 01:57 PM         Medications Reviewed:     Current Facility-Administered Medications   Medication Dose Route Frequency    hydrALAZINE (APRESOLINE) 20 mg/mL injection 20 mg  20 mg IntraVENous Q6H PRN    sodium chloride (NS) flush 5-40 mL  5-40 mL IntraVENous Q8H    sodium chloride (NS) flush 5-40 mL  5-40 mL IntraVENous PRN    acetaminophen (TYLENOL) tablet 650 mg  650 mg Oral Q4H PRN    naloxone (NARCAN) injection 0.4 mg  0.4 mg IntraVENous PRN    ondansetron (ZOFRAN) injection 4 mg  4 mg IntraVENous Q4H PRN    losartan (COZAAR) tablet 50 mg  50 mg Oral DAILY    atorvastatin (LIPITOR) tablet 40 mg  40 mg Oral QHS    simethicone (MYLICON) tablet 80 mg  80 mg Oral QID PRN     ______________________________________________________________________  EXPECTED LENGTH OF STAY: 3d 7h  ACTUAL LENGTH OF STAY:          2                 Daya Patterson MD

## 2021-03-29 NOTE — PROGRESS NOTES
Transition of Care Plan:  RUR-8%  1. PT eval  2. Liver biopsy after OP radiation  3. Family will transport at discharge  4. Follow up with PCP, specialties as necessary   5. CM will continue to follow and assist with discharge planning  BIB Pritchard RN

## 2021-03-30 LAB — CEA SERPL-MCNC: 8273.1 NG/ML

## 2021-03-30 PROCEDURE — 82378 CARCINOEMBRYONIC ANTIGEN: CPT

## 2021-03-30 PROCEDURE — 36415 COLL VENOUS BLD VENIPUNCTURE: CPT

## 2021-03-30 PROCEDURE — 99223 1ST HOSP IP/OBS HIGH 75: CPT | Performed by: NURSE PRACTITIONER

## 2021-03-30 PROCEDURE — 99218 HC RM OBSERVATION: CPT

## 2021-03-30 PROCEDURE — 99232 SBSQ HOSP IP/OBS MODERATE 35: CPT | Performed by: INTERNAL MEDICINE

## 2021-03-30 RX ORDER — ATORVASTATIN CALCIUM 40 MG/1
40 TABLET, FILM COATED ORAL
Qty: 30 TAB | Refills: 0 | Status: SHIPPED | OUTPATIENT
Start: 2021-03-30

## 2021-03-30 NOTE — PROGRESS NOTES
[address] [facility] Oncology [facility]  [phone]    Hematology / Oncology Follow-up        Patient: Bev Hassan MRN: [id number]  SSN: [id number]    YOB: 1945  Age: 77 y.o. Sex: female      Subjective:      Bev Hassan is a 77 y.o. female who is admitted from the ED for weakness and dyspnea. She has lost over 30 lbs in the last 6 months. Dyspnea is slowly progressive. She has a history of cigarette smoking. She denies headache or bone pains. Interval History: Tolerated liver bx today. Has had some numbness to left hand but today not experiencing any. Reports 30# over the last 6 months. Has problems with constipation. Had some type of cancer approx 3 yrs ago and had surgery; possibly colon; did not receive chemo or XRT following surgery; surgery at [facility] with Dr Lacey Munguia Hx of cancer  None known    ; 2 sons; 1 local and 1 in [place]  Retired . Smoking hx: denies   ETOH: denies    Lives with niece    Addendum of note: received records from Dr Sarkis Tovar dated 3/22/2018;  Reports pt initially seen 1 yr prior at [facility] with near obstructing lesion in her sigmoid colon with biopsy positive for adenocarcinoma. She opted not to proceed with surgery at that time. She presented to the ED approx 1 1/2 ago with similar symptoms; was scheduled for OR but after having a BM declined operation. Now presenting again with abd distention, pain and constipation. 3/36/2018 underwent lap sigmoid colectomy for obstructing colon carcinoma by Dr Sarkis Tovar  According to Dr Neil Bonilla office staff; pt had not been seen in the clinic       Interval History:     Going home; feeling well. Denies any pain. Reviewed follow up with pt. Past Medical History:   Diagnosis Date    Hypertension      No past surgical history on file.    Family History   Problem Relation Age of Onset    Asthma Mother     Heart Disease Father      Social History Tobacco Use    Smoking status: Never Smoker   Substance Use Topics    Alcohol use: No      Prior to Admission medications    Medication Sig Start Date End Date Taking? Authorizing Provider   atorvastatin (LIPITOR) 40 mg tablet Take 1 Tab by mouth nightly. 3/30/21  Yes Elsi Jeffries MD   losartan (COZAAR) 50 mg tablet Take 50 mg by mouth daily. Yes Provider, Historical              Allergies   Allergen Reactions    Aspirin Other (comments)     Eyes swelling.            Objective:     Vitals:    03/30/21 0319 03/30/21 0803 03/30/21 0811 03/30/21 1106   BP: (!) 145/93 (!) 156/86 (!) 142/85 (!) 149/90   Pulse: 85 (!) 114 (!) 109 (!) 113   Resp: 16 16 16 18   Temp: 98 °F (36.7 °C) 97.5 °F (36.4 °C) 97.5 °F (36.4 °C) 97.6 °F (36.4 °C)   SpO2: 95% 97% 97% 95%   Weight:       Height:          General: frail, no distress  Eyes: anicteric sclerae  HENT: atraumatic  Neck: supple  Respiratory: decreased breath sound on left; no wheezing or rhonchi noted; normal respiratory effort  CV: no peripheral edema  GI: soft, nontender, nondistended, no masses, no hepatomegaly, no splenomegaly  Skin: no rashes; no ecchymoses; no petechiae  Psych: alert, oriented, appropriate affect, fair judgment/insight      Lab Results   Component Value Date/Time    WBC 7.7 03/29/2021 02:27 AM    HGB 12.1 03/29/2021 02:27 AM    HCT 37.2 03/29/2021 02:27 AM    PLATELET 687 08/68/8042 02:27 AM    MCV 91.0 03/29/2021 02:27 AM         Lab Results   Component Value Date/Time    Sodium 139 03/29/2021 02:27 AM    Potassium 3.5 03/29/2021 02:27 AM    Chloride 105 03/29/2021 02:27 AM    CO2 29 03/29/2021 02:27 AM    Anion gap 5 03/29/2021 02:27 AM    Glucose 81 03/29/2021 02:27 AM    BUN 11 03/29/2021 02:27 AM    Creatinine 0.54 (L) 03/29/2021 02:27 AM    BUN/Creatinine ratio 20 03/29/2021 02:27 AM    GFR est AA >60 03/29/2021 02:27 AM    GFR est non-AA >60 03/29/2021 02:27 AM    Calcium 9.2 03/29/2021 02:27 AM    Bilirubin, total 0.5 03/27/2021 02:43 PM Alk. phosphatase 175 (H) 03/27/2021 02:43 PM    Protein, total 7.5 03/27/2021 02:43 PM    Albumin 3.2 (L) 03/27/2021 02:43 PM    Globulin 4.3 (H) 03/27/2021 02:43 PM    A-G Ratio 0.7 (L) 03/27/2021 02:43 PM    ALT (SGPT) 30 03/27/2021 02:43 PM    AST (SGOT) 41 (H) 03/27/2021 02:43 PM         CT Results (most recent):  Results from [place] encounter on 03/27/21   CT ABD PELV W CONT    Narrative EXAM:  CT CHEST W CONT, CT ABD PELV W CONT    INDICATION: Abnormal CXR, 30+ lb weight loss over past 6 months, chronic cough    COMPARISON: None. CONTRAST:  100 mL of Isovue-370. TECHNIQUE:   Following the uneventful intravenous administration of contrast, thin axial  images were obtained through the chest, abdomen and pelvis. Coronal and sagittal  reconstructions were generated. Oral contrast was not administered. CT dose  reduction was achieved through use of a standardized protocol tailored for this  examination and automatic exposure control for dose modulation. FINDINGS:   Chest:  Lungs/Pleura: There is complete atelectasis of the left lower lobe with a mass  in the left lower lobe infrahilar region obstructing the left lower lobe  bronchus, which appears to measure approximately 5.6 x 4.5 cm. There are  innumerable other pulmonary nodules/masses, largest examples as below:  Right upper lobe mass measuring 2.8 x 2.5 cm (series 4 image 17). Right lower lobe mass measuring 3.5 x 3.2 cm (series 4 image 32). Right middle lobe mass measuring 2.3 x 1.6 and meter (series 4 image 35). Left upper 1lobe mass measuring 4.6 x 4.0 cm (series 4 image 627). Left upper lobe mass measuring 2.8 x 2.5 cm (series 4 image 12). Small left pleural effusion. Axilla/Soft Tissue: No pathologic axillary adenopathy. Multinodular thyroid  gland, largest nodule in the right thyroid lobe measuring 16 mm. Mediastinum: Cardiomegaly. Trace pericardial effusion. No pathologic mediastinal  or hilar adenopathy.     Bones: No evidence of acute fracture, dislocation, or aggressive osseous  abnormality. Abdomen/Pelvis:  Liver:  Numerous heterogeneous enhancing, hypodense hepatic lesions, consistent  with metastases, largest examples as below:  Segment 2/3 metastasis measuring 7.4 x 5.5 cm (series 8 image 19). Segment 2/3 metastasis measuring 4.3 x 4.2 cm (series 8 image 27). Segment 4 metastasis measuring 4.8 x 4.0 cm (series 8 image 28). Segment 8 metastasis measuring 3.5 x 2.9 cm (series 8 image 21). Biliary system: Gallbladder is markable. No intrahepatic or extrahepatic biliary  ductal dilatation. Spleen: Normal.    Pancreas: Normal.    Kidneys/Ureters/Bladder: Indeterminant hypodense lesion in the lower pole the  right kidney measuring 1.4 x 1.2 cm (series 610 image 41). There are multiple  other simple cysts in the kidneys, largest in the lower pole the right kidney  measuring 2.5 cm, which do not require follow-up. No renal or ureteral calculi. No hydronephrosis or hydroureter. The bladder is normal.    Adrenals: Normal.    Stomach/bowel: No dilation or abnormal wall thickening is present. No free  intraperitoneal air noted. Reproductive Organs: Uterus is present. No suspicious adnexal masses. Vasculature: Normal caliber arteries. Moderate calcific atherosclerosis of the  abdominal aorta and iliac vasculature. Portal vein, SMV, and splenic vein are  patent. Nodes: No pathologically enlarged lymph nodes. Fluid: Trace ascites. Bones/Soft Tissue: No acute fractures or aggressive osseous lesions are seen. Multilevel degenerative disc disease throughout the lumbar spine. Impression Chest:  1. Left lower lobe infrahilar mass measuring 5.6 x 4.5 cm, with obstruction of  the left lower lobe bronchus and complete atelectasis of the left lower lobe. This may represent primary lung malignancy.   2. Innumerable metastatic pulmonary masses and nodules throughout the lungs,  largest examples as given above.  3. Small left pleural effusion. Abdomen/pelvis:  1. Numerous large hepatic metastases. 2. Indeterminate hypodense lesion in the lower pole of the right kidney  measuring 1.4 x 1.2 cm. This can be further evaluated with CT or MRI renal  protocol. Assessment/ Plan     1. 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 Brain mets  Single metastatic focus in left cerebellar hemisphere noted on brain MRI  - Rad-Onc consulted: reviewed with Dr Lobito Montiel;  plans for [facility] knife in the future with Dr Hilda Wang as outpatient Will work on establishing follow up for pt at discharge; discussed with rad/onc. 3. Severe protein calorie malnutrition  Protein supplementation   Dietician consultation    4. Debility  PT eval      5.  Hx of colon cancer  See summary of notes obtained from Dr Derick Leon as above  3/26/2018 S/p lap sigmoid colectomy    Plan reviewed with Dr Catalina Frias By: Evaristo Thomson NP     March 30, 2021

## 2021-03-30 NOTE — DISCHARGE SUMMARY
Discharge Summary       PATIENT ID: Roberto Nolen  MRN: [id number]   YOB: 1945    DATE OF ADMISSION: 3/27/2021 11:27 AM    DATE OF DISCHARGE: 03/30/21   PRIMARY CARE PROVIDER: Andrew Quezada MD     DISCHARGING PROVIDER: Shiva Conteh MD      CONSULTATIONS: IP CONSULT TO ONCOLOGY  IP CONSULT TO PALLIATIVE CARE - PROVIDER  IP CONSULT TO RADIATION ONCOLOGY    PROCEDURES/SURGERIES: * No surgery found *    [address] COURSE:   78 yo female presented to the Ed with left hand numbness, she was found to have metastatic cancer, unclear primary. Metastatic cancer  -primary unclear, though suspecting lung  -consult heme/onc, s/p liver biopsy  -MRI brain shows met in the left cerebellar hemisphere, neurosurgery plans possible radiosurgery post discharge  -palliative consulted to help with goals of care     Numbness/tingling of left hand  -likely to be 2/2 carpal tunnel, symptoms not consistent with location of brain mets on MRI  -PT to eval, may benefit from bracing     HLD  -start statin,      HTN  -cont losartan    DISCHARGE DIAGNOSES / PLAN:      1. Metastatic cancer  2. Numbness/tingling left hand  3. HLD  4. HTN     ADDITIONAL CARE RECOMMENDATIONS:   Follow up with oncology to discuss biopsy results. Make an appt to see your PCP in 1 week.       PENDING TEST RESULTS:   At the time of discharge the following test results are still pending: liver biopsy    FOLLOW UP APPOINTMENTS:    Follow-up Information     Follow up With Specialties Details Why Contact Mehnaz Mayfield MD Internal Medicine In 1 week Hospital discharge follow up [address] [place] [address] [address] [facility]      Geovanny Oconnell MD Hematology and Oncology, Internal Medicine, Hematology, Oncology In 1 week Hospital discharge follow up [address]  [phone]               DIET: regular    ACTIVITY: as tolerated       DISCHARGE MEDICATIONS:  Current Discharge Medication List      CONTINUE these medications which have NOT CHANGED    Details   losartan (COZAAR) 50 mg tablet Take 50 mg by mouth daily. NOTIFY YOUR PHYSICIAN FOR ANY OF THE FOLLOWING:   Fever over 101 degrees for 24 hours. Chest pain, shortness of breath, fever, chills, nausea, vomiting, diarrhea, change in mentation, falling, weakness, bleeding. Severe pain or pain not relieved by medications. Or, any other signs or symptoms that you may have questions about. DISPOSITION:  x Home With:   OT  PT  HH  RN       Long term SNF/Inpatient Rehab    Independent/assisted living    Hospice    Other:       PATIENT CONDITION AT DISCHARGE:     Functional status    Poor     Deconditioned    x Independent      Cognition   x  Lucid     Forgetful     Dementia      Catheters/lines (plus indication)    Nogueira     PICC     PEG    x None      Code status    x Full code     DNR      PHYSICAL EXAMINATION AT DISCHARGE:  General:          Alert, cooperative, no distress, appears stated age. HEENT:           Atraumatic, anicteric sclerae, pink conjunctivae                          No oral ulcers, mucosa moist, throat clear, dentition fair  Neck:               Supple, symmetrical  Lungs:             Clear to auscultation bilaterally. No Wheezing or Rhonchi. No rales. Heart:              Regular  rhythm,  No  murmur   No edema  Abdomen:        Soft, non-tender. Not distended. Bowel sounds normal  Extremities:     No cyanosis. No clubbing,                            Skin turgor normal, Capillary refill normal  Skin:                Not pale. Not Jaundiced  No rashes   Psych:             Not anxious or agitated.   Neurologic:      Alert, moves all extremities, answers questions appropriately and responds to commands       CHRONIC MEDICAL DIAGNOSES:  Problem List as of 3/30/2021 Date Reviewed: 10/11/2013          Codes Class Noted - Resolved    Lung cancer metastatic to brain ([facility] ICD-10-CM: C34.90, C79.31  ICD-9-CM: 162.9, 198.3  3/28/2021 - Present        Severe protein-calorie malnutrition Laurette Snellen: less than 60% of standard weight) ([facility] ICD-10-CM: E43  ICD-9-CM: 262  3/28/2021 - Present        Metastatic cancer ([facility] ICD-10-CM: C79.9  ICD-9-CM: 199.1  3/27/2021 - Present        Hypertension ICD-10-CM: I10  ICD-9-CM: 401.9  3/27/2021 - Present        Hyperlipidemia ICD-10-CM: E78.5  ICD-9-CM: 272.4  3/27/2021 - Present        Numbness and tingling of left hand ICD-10-CM: R20.0, R20.2  ICD-9-CM: 782.0  3/27/2021 - Present    Overview Signed 3/27/2021  6:26 PM by Zenia Essex, MD     Likely carpal tunnel                   Greater than 25 minutes were spent with the patient on counseling and coordination of care    Signed:   Faheem Walsh MD  3/30/2021  9:31 AM

## 2021-03-30 NOTE — PROGRESS NOTES
Discharge medications reviewed with patient and caregiver and appropriate educational materials and side effects teaching were provided. I have reviewed discharge instructions with the patient and caregiver. The patient and caregiver verbalized understanding. AVS signed via esign.

## 2021-03-30 NOTE — PROGRESS NOTES
12:15 PM  RAMSES attempted to set pt up for a follow up appointment with her [address] [facility] assigned PCP, was told pt is not yet established with Primary care at the clinic and they will call her to set up an appointment. Informed pt the clinic will call her.  Added PCP information to Vidhi Rosales

## 2021-03-30 NOTE — DISCHARGE INSTRUCTIONS
Discharge Instructions       PATIENT ID: Joseph Black  MRN: [id number]   YOB: 1945    DATE OF ADMISSION: 3/27/2021 11:27 AM    DATE OF DISCHARGE: 3/30/2021    PRIMARY CARE PROVIDER: Patito Roland MD     ATTENDING PHYSICIAN: Oscar Lizarraga MD  DISCHARGING PROVIDER: Barbara Tillman MD        DISCHARGE DIAGNOSES   1. Metastatic cancer  2. Numbness/tingling left hand  3. HLD  4. HTN    CONSULTATIONS: IP CONSULT TO ONCOLOGY  IP CONSULT TO PALLIATIVE CARE - PROVIDER  IP CONSULT TO RADIATION ONCOLOGY    PROCEDURES/SURGERIES: * No surgery found *    PENDING TEST RESULTS:   At the time of discharge the following test results are still pending: none    FOLLOW UP APPOINTMENTS:   Follow-up Information     Follow up With Specialties Details Why Contact Edith Torre MD Internal Medicine In 1 week Hospital discharge follow up [address] [place] [address]      Macey Kemp MD Hematology and Oncology, Internal Medicine, Hematology, Oncology In 1 week Hospital discharge follow up [address]  [phone]             ADDITIONAL CARE RECOMMENDATIONS:   Follow up with oncology to discuss biopsy results. Make an appt to see your PCP in 1 week. DIET: regular    ACTIVITY: as tolerated       DISCHARGE MEDICATIONS:   See Medication Reconciliation Form    · It is important that you take the medication exactly as they are prescribed. · Keep your medication in the bottles provided by the pharmacist and keep a list of the medication names, dosages, and times to be taken in your wallet. · Do not take other medications without consulting your doctor. NOTIFY YOUR PHYSICIAN FOR ANY OF THE FOLLOWING:   Fever over 101 degrees for 24 hours. Chest pain, shortness of breath, fever, chills, nausea, vomiting, diarrhea, change in mentation, falling, weakness, bleeding. Severe pain or pain not relieved by medications.   Or, any other signs or symptoms that you may have questions about.       DISPOSITION:  x Home With:   OT  PT  HH  RN       SNF/Inpatient Rehab/LTAC    Independent/assisted living    Hospice    Other:     CDMP Checked:   Yes ***     PROBLEM LIST Updated:  Yes ***       Signed:   Cecily Wilkes MD  3/30/2021  9:34 AM

## 2021-03-30 NOTE — PROGRESS NOTES
Spiritual Care Assessment/Progress Note  [facility]      NAME: Debby Lanes      MRN: [id number]  AGE: 77 y.o. SEX: female  Evangelical Affiliation: Other   Language: English     3/30/2021     Total Time (in minutes): 4     Spiritual Assessment begun in [facility] 5M1 MED SURG 1 through conversation with:         [x]Patient        [] Family    [] Friend(s)        Reason for Consult: Palliative Care, Initial/Spiritual Assessment     Spiritual beliefs: (Please include comment if needed)     [] Identifies with a herman tradition:         [] Supported by a herman community:            [] Claims no spiritual orientation:           [] Seeking spiritual identity:                [] Adheres to an individual form of spirituality:           [x] Not able to assess:                           Identified resources for coping:      [] Prayer                               [] Music                  [] Guided Imagery     [] Family/friends                 [] Pet visits     [] Devotional reading                         [x] Unknown     [] Other:                                              Interventions offered during this visit: (See comments for more details)    Patient Interventions: Other (comment)(Unable to assess)           Plan of Care:     [] Support spiritual and/or cultural needs    [] Support AMD and/or advance care planning process      [] Support grieving process   [] Coordinate Rites and/or Rituals    [] Coordination with community clergy   [] No spiritual needs identified at this time   [] Detailed Plan of Care below (See Comments)  [] Make referral to Music Therapy  [] Make referral to Pet Therapy     [] Make referral to Addiction services  [] Make referral to [facility]  [] Make referral to Spiritual Care Partner  [] No future visits requested        [x] Follow up upon further referrals     Comments:  visited Mrs. Fields for a palliative care initial spiritual assessment on the Med. Surgical Unit.  Mrs. Diamond was awake, alert, and sitting on the edge of her bed when the  came into the room. She had all of her belongings packed and appeared to be ready for discharge. She was speaking on the telephone when the  came by. She briefly paused her conversation, for the  to introduced herself, and requested  stop by later. 's are available for further support upon referral  Latanya Rodriguez. Meryl Castro.      Paging Service: [phone])

## 2021-03-30 NOTE — PROGRESS NOTES
Pharmacist Discharge Medication Reconciliation    Discharge Provider:  Dr. Gladys Junior       Discharge Medications:      My Medications        START taking these medications        Instructions Each Dose to Equal Morning Noon Evening Bedtime   atorvastatin 40 mg tablet  Commonly known as: LIPITOR    Your last dose was: Your next dose is: Take 1 Tab by mouth nightly. 40 mg                        CONTINUE taking these medications        Instructions Each Dose to Equal Morning Noon Evening Bedtime   losartan 50 mg tablet  Commonly known as: COZAAR    Your last dose was: Your next dose is: Take 50 mg by mouth daily.    50 mg                           Where to Get Your Medications        These medications were sent to [address] [place] [address] Rd  [address] [address]      Phone: [phone]   atorvastatin 40 mg tablet       Patient to follow up with oncology outpatient to address metastatic CA (liver, lung, brain)  LDL elevated - atorvastatin started  Losartan continued     The patient's chart, MAR, and AVS were reviewed by   Sammy Tello, Jim Reza,   Contact: [phone]

## 2021-03-30 NOTE — CONSULTS
Palliative Medicine Consult  [place]: [phone] ([phone])    Patient Name: Adalid Gutierrez  YOB: 1945    Date of Initial Consult: 3/30/2021  Reason for Consult: GOC discussion  Requesting Provider: Dr. Dinora Cisneros  Primary Care Physician: Jasvir Sanders MD     SUMMARY:   Adalid Gutierrez is a 77 y. o. with a past history of HTN, adenocarcinoma and bowel obstruction s/p colectomy, no further tx or follow up. Patient presented to ER from [facility], where they recommended further workup for cc of dizziness, nausea, vomiting,  diarrhea and new right arm numbness x 1 day, as well as decreased appetite/intake, with associated  30 lb weight loss over the past 6-8 months. Upon further discussion, she also acknowledged intermittent cough and SOB over past year. In ER patient noted to be word searching and to have left sided facial droop, code stroke called. Vitals +Hypertensive emergency. Labs mostly unremarkable. CT head significant moderate small vessel ichemic dx and age indeterminate lacunar infarcts. CT Chest significant for LLL infrahilar mass, w. Obstruction of LLL bronchus and complete atelectasis of LLL, _innumerable metastatic pulmonary masses and nodules, small left pleural effusion. CT abdomen/pelvis significant for numerous large hepatic metastases, indeterminate hypodense lesion Right kidney    Patient admitted on 3/27/2021 with a diagnosis of Lung Mass and Uncontrolled HTN. Chart Review/ Course of hospitalization: Likely metastatic cancer with unclear primary, though suspect Lung. MRI significant for left cerebellar met. 3/29 liver biopsy, path pending. Brain mets amenable to gamaknife, will be addressed outpatient. Oncology found that patient with hx of adenocarcinoma dx 2017, however did not follow up and until 3/22/18 presented to [facility] and underwent  sigmoid colectomy for obstructing colon carcinoma, after which patient never followed up.       Current medical issues leading to Palliative Medicine involvement include: Meet 64 discussion in setting of newly dx lung mass. PALLIATIVE DIAGNOSES:   1. Metastatic cancer, unknown primary  2. Advanced care Planning Discussion  3. DNR Discussion  4. Goals of care discussion       PLAN:   1. Prior to visit I spoke with patients nurse [facility] RN. 2. Patient was seen, she was sitting in bedside chair, just finished talking on phone with family. Introduced myself, assessed her understanding of hospitalization. Patient spoke of findings in general terms, did not use the word cancer, expressed her greatest concerns were related to having \"somethining in her brain\". 3. 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 4. Advanced care planning Discussion- NO AMD. Patient is , she has 2 adult sons, who are her legal NOK. Patient also has siblings. 5. DNR Discussion- Patient with Full Code status. Discussed CPR, benefits vs burdens,  patient stated that she would want CPR. 6. Goals of Care Discussion- Stated that she wants Full Restorative, plans to follow up with medical appointments. 7. Patient planning for discharge home today, stated that she will be going to her sisters house, because she does not have any stairs, after she regains strength, she will return to her own home  8. Patient declined my offer to call her sons to give them an update. 9. Initial consult note routed to primary continuity provider and/or primary health care team members  10.  Communicated plan of care with: Palliative [facility] Team, [facility] RN     GOALS OF CARE / TREATMENT PREFERENCES:     GOALS OF CARE:  Patient/Health Care Proxy Stated Goals: Prolong life    TREATMENT PREFERENCES:   Code Status: Prior    Advance Care Planning:  [x] [facility] Med Interdisciplinary Team has updated the ACP Navigator with Health Care Decision Maker and Patient Capacity      Advance Care Planning 3/28/2021   Patient's Healthcare Decision Maker is: Verbal statement (Legal Next of Kin remains as decision maker)   Confirm Advance Directive Yes, not on file       Medical Interventions: Full interventions     Other Instructions: Other:    As far as possible, the palliative care team has discussed with patient / health care proxy about goals of care / treatment preferences for patient. HISTORY:     History obtained from: medical records/nurse/patient    CHIEF COMPLAINT: I dont like having this thing in my head    HPI/SUBJECTIVE:    The patient is:   [x] Verbal and participatory  [] Non-participatory due to:   Denied pain, denied sob, denied nausea/vomiting, however she does report poor appetite    Clinical Pain Assessment (nonverbal scale for severity on nonverbal patients):   Clinical Pain Assessment  Severity: 0     Activity (Movement): Lying quietly, normal position    Duration: for how long has pt been experiencing pain (e.g., 2 days, 1 month, years)  Frequency: how often pain is an issue (e.g., several times per day, once every few days, constant)     FUNCTIONAL ASSESSMENT:     Palliative Performance Scale (PPS):  PPS: 50       PSYCHOSOCIAL/SPIRITUAL SCREENING:     Palliative IDT has assessed this patient for cultural preferences / practices and a referral made as appropriate to needs (Cultural Services, Patient Advocacy, Ethics, etc.)    Any spiritual / Scientology concerns:  [] Yes /  [x] No    Caregiver Burnout:  [] Yes /  [] No /  [x] No Caregiver Present      Anticipatory grief assessment:   [x] Normal  / [] Maladaptive       ESAS Anxiety: Anxiety: 0    ESAS Depression: Depression: 0        REVIEW OF SYSTEMS:     Positive and pertinent negative findings in ROS are noted above in HPI.   The following systems were [x] reviewed / [] unable to be reviewed as noted in HPI  Other findings are noted below. Systems: constitutional, ears/nose/mouth/throat, respiratory, gastrointestinal, genitourinary, musculoskeletal, integumentary, neurologic, psychiatric, endocrine. Positive findings noted below. Modified ESAS Completed by: provider   Fatigue: 2 Drowsiness: 0   Depression: 0 Pain: 0   Anxiety: 0 Nausea: 0   Anorexia: 7 Dyspnea: 0           Stool Occurrence(s): 0        PHYSICAL EXAM:     From RN flowsheet:  Wt Readings from Last 3 Encounters:   03/27/21 100 lb 12 oz (45.7 kg)   10/11/13 168 lb (76.2 kg)   07/16/13 165 lb 3.2 oz (74.9 kg)     Blood pressure (!) 149/90, pulse (!) 113, temperature 97.6 °F (36.4 °C), resp. rate 18, height 5' 3\" (1.6 m), weight 100 lb 12 oz (45.7 kg), SpO2 95 %. Pain Scale 1: Numeric (0 - 10)  Pain Intensity 1: 0                 Last bowel movement, if known:     Constitutional: appears stated age, thin, frail, cachectic, alert, NAD  Eyes: pupils equal, anicteric  ENMT: no nasal discharge, moist mucous membranes  Cardiovascular: regular rhythm, distal pulses intact  Respiratory: breathing not labored, symmetric  Gastrointestinal: soft non-tender, +bowel sounds  Musculoskeletal: no deformity, no tenderness to palpation  Skin: warm, dry  Neurologic: following commands, moving all extremities  Psychiatric: full affect, no hallucinations  Other:       HISTORY:     Active Problems:    Metastatic cancer ([address] (3/27/2021)      Hypertension (3/27/2021)      Hyperlipidemia (3/27/2021)      Numbness and tingling of left hand (3/27/2021)      Overview: Likely carpal tunnel      Lung cancer metastatic to brain ([address] (3/28/2021)      Severe protein-calorie malnutrition Brain Haring: less than 60% of standard weight) ([address] (3/28/2021)      Past Medical History:   Diagnosis Date    Hypertension       No past surgical history on file.    Family History   Problem Relation Age of Onset    Asthma Mother     Heart Disease Father       History reviewed, no pertinent family history. Social History     Tobacco Use    Smoking status: Never Smoker   Substance Use Topics    Alcohol use: No     Allergies   Allergen Reactions    Aspirin Other (comments)     Eyes swelling. No current facility-administered medications for this encounter. Current Outpatient Medications   Medication Sig    atorvastatin (LIPITOR) 40 mg tablet Take 1 Tab by mouth nightly.  losartan (COZAAR) 50 mg tablet Take 50 mg by mouth daily. LAB AND IMAGING FINDINGS:     Lab Results   Component Value Date/Time    WBC 7.7 03/29/2021 02:27 AM    HGB 12.1 03/29/2021 02:27 AM    PLATELET 321 64/61/0415 02:27 AM     Lab Results   Component Value Date/Time    Sodium 139 03/29/2021 02:27 AM    Potassium 3.5 03/29/2021 02:27 AM    Chloride 105 03/29/2021 02:27 AM    CO2 29 03/29/2021 02:27 AM    BUN 11 03/29/2021 02:27 AM    Creatinine 0.54 (L) 03/29/2021 02:27 AM    Calcium 9.2 03/29/2021 02:27 AM    Magnesium 2.1 03/28/2021 12:18 AM      Lab Results   Component Value Date/Time    Alk. phosphatase 175 (H) 03/27/2021 02:43 PM    Protein, total 7.5 03/27/2021 02:43 PM    Albumin 3.2 (L) 03/27/2021 02:43 PM    Globulin 4.3 (H) 03/27/2021 02:43 PM     Lab Results   Component Value Date/Time    INR 1.1 03/27/2021 02:43 PM    Prothrombin time 11.2 (H) 03/27/2021 02:43 PM    aPTT 24.8 03/27/2021 02:43 PM      No results found for: IRON, FE, TIBC, IBCT, PSAT, FERR   No results found for: PH, PCO2, PO2  No components found for: GLPOC   No results found for: CPK, CKMB             Total time: 70 min  Counseling / coordination time, spent as noted above: 50 min   > 50% counseling / coordination?: yes    Prolonged service was provided for  []30 min   []75 min in face to face time in the presence of the patient, spent as noted above. Time Start:   Time End:   Note: this can only be billed with 43771 (initial) or 98769 (follow up). If multiple start / stop times, list each separately.

## 2021-03-31 VITALS
SYSTOLIC BLOOD PRESSURE: 149 MMHG | DIASTOLIC BLOOD PRESSURE: 90 MMHG | RESPIRATION RATE: 18 BRPM | HEART RATE: 113 BPM | TEMPERATURE: 97.6 F | OXYGEN SATURATION: 95 % | HEIGHT: 63 IN | BODY MASS INDEX: 17.85 KG/M2 | WEIGHT: 100.75 LBS

## 2021-04-05 ENCOUNTER — TELEPHONE (OUTPATIENT)
Dept: ONCOLOGY | Age: 76
End: 2021-04-05

## 2021-04-06 ENCOUNTER — VIRTUAL VISIT (OUTPATIENT)
Dept: ONCOLOGY | Age: 76
End: 2021-04-06
Payer: MEDICARE

## 2021-04-06 DIAGNOSIS — C18.9 METASTATIC COLON CANCER TO LIVER (HCC): Primary | ICD-10-CM

## 2021-04-06 DIAGNOSIS — C78.7 METASTATIC COLON CANCER TO LIVER (HCC): Primary | ICD-10-CM

## 2021-04-06 PROCEDURE — G8419 CALC BMI OUT NRM PARAM NOF/U: HCPCS | Performed by: INTERNAL MEDICINE

## 2021-04-06 PROCEDURE — G8432 DEP SCR NOT DOC, RNG: HCPCS | Performed by: INTERNAL MEDICINE

## 2021-04-06 PROCEDURE — 1090F PRES/ABSN URINE INCON ASSESS: CPT | Performed by: INTERNAL MEDICINE

## 2021-04-06 PROCEDURE — G8756 NO BP MEASURE DOC: HCPCS | Performed by: INTERNAL MEDICINE

## 2021-04-06 PROCEDURE — G8400 PT W/DXA NO RESULTS DOC: HCPCS | Performed by: INTERNAL MEDICINE

## 2021-04-06 PROCEDURE — 1101F PT FALLS ASSESS-DOCD LE1/YR: CPT | Performed by: INTERNAL MEDICINE

## 2021-04-06 PROCEDURE — G8536 NO DOC ELDER MAL SCRN: HCPCS | Performed by: INTERNAL MEDICINE

## 2021-04-06 PROCEDURE — G8427 DOCREV CUR MEDS BY ELIG CLIN: HCPCS | Performed by: INTERNAL MEDICINE

## 2021-04-06 PROCEDURE — G9711 PT HX TOT COL OR COLON CA: HCPCS | Performed by: INTERNAL MEDICINE

## 2021-04-06 PROCEDURE — 99215 OFFICE O/P EST HI 40 MIN: CPT | Performed by: INTERNAL MEDICINE

## 2021-04-06 NOTE — PROGRESS NOTES
[id number] [facility] at [address] [place] [address]  [phone]    Hematology / Oncology Follow-up        Patient: Khris Shabazz MRN: [id number]  SSN: [id number]    YOB: 1945  Age: 77 y.o. Sex: female      Subjective:        Reason for Visit:   Khris Shabazz is a 77 y.o. female who is seen by synchronous (real-time) audio-video technology for follow up of metastatic colon cancer          Interval History:     Was admitted from 3/27/21-3/30/21 for left hand numbness, was found to have metastatic cancer, including to the brain. Reports 30# over the last 6 months. States she is spending 19 hours a day in bed since discharge. States appetite is fair. Denies pain. Family Hx of cancer  None known    ; 2 sons; 1 local and 1 in [place]  Retired . Smoking hx: denies   ETOH: denies    Lives with niece    Treatment history:    received records from Dr Rodrigo William dated 3/22/2018;  Reports pt initially seen 1 yr prior at [facility] with near obstructing lesion in her sigmoid colon with biopsy positive for adenocarcinoma. She opted not to proceed with surgery at that time. She presented to the ED approx 1 1/2 ago with similar symptoms; was scheduled for OR but after having a BM declined operation. 3/30/2018 underwent lap sigmoid colectomy for obstructing colon carcinoma by Dr Rodrigo William    According to Dr Marco Antonio Jain office staff; pt had not been seen in the clinic     3/29/21 liver core bx:  Metastatic adenocarcinoma, c/w colorectal primary      Past Medical History:   Diagnosis Date    Hypertension      History reviewed. No pertinent surgical history. Family History   Problem Relation Age of Onset    Asthma Mother     Heart Disease Father      Social History     Tobacco Use    Smoking status: Never Smoker   Substance Use Topics    Alcohol use: No      Prior to Admission medications    Medication Sig Start Date End Date Taking?  Authorizing Provider   atorvastatin (LIPITOR) 40 mg tablet Take 1 Tab by mouth nightly. 3/30/21  Yes Gracie Martinez MD   losartan (COZAAR) 50 mg tablet Take 50 mg by mouth daily. Yes Provider, Historical              Allergies   Allergen Reactions    Aspirin Other (comments)     Eyes swelling. Objective: There were no vitals filed for this visit. PS 3  General: alert, cooperative, no distress   Mental  status: normal mood, behavior, speech, dress, motor activity, and thought processes, able to follow commands   HENT: NCAT   Neck: no visualized mass   Resp: no respiratory distress   Neuro: no gross deficits   Skin: no discoloration or lesions of concern on visible areas   Psychiatric: normal affect, consistent with stated mood, no evidence of hallucinations       Due to this being a TeleHealth evaluation (During ZXKEA-01 public health emergency), many elements of the physical examination are unable to be assessed. Evaluation of the following organ systems was limited: Vitals/Constitutional/EENT/Resp/CV/GI//MS/Neuro/Skin/Heme-Lymph-Imm. Lab Results   Component Value Date/Time    WBC 7.7 03/29/2021 02:27 AM    HGB 12.1 03/29/2021 02:27 AM    HCT 37.2 03/29/2021 02:27 AM    PLATELET 880 67/53/9208 02:27 AM    MCV 91.0 03/29/2021 02:27 AM         Lab Results   Component Value Date/Time    Sodium 139 03/29/2021 02:27 AM    Potassium 3.5 03/29/2021 02:27 AM    Chloride 105 03/29/2021 02:27 AM    CO2 29 03/29/2021 02:27 AM    Anion gap 5 03/29/2021 02:27 AM    Glucose 81 03/29/2021 02:27 AM    BUN 11 03/29/2021 02:27 AM    Creatinine 0.54 (L) 03/29/2021 02:27 AM    BUN/Creatinine ratio 20 03/29/2021 02:27 AM    GFR est AA >60 03/29/2021 02:27 AM    GFR est non-AA >60 03/29/2021 02:27 AM    Calcium 9.2 03/29/2021 02:27 AM    Bilirubin, total 0.5 03/27/2021 02:43 PM    Alk.  phosphatase 175 (H) 03/27/2021 02:43 PM    Protein, total 7.5 03/27/2021 02:43 PM    Albumin 3.2 (L) 03/27/2021 02:43 PM    Globulin 4.3 (H) 03/27/2021 02:43 PM    A-G Ratio 0.7 (L) 03/27/2021 02:43 PM    ALT (SGPT) 30 03/27/2021 02:43 PM    AST (SGOT) 41 (H) 03/27/2021 02:43 PM         CT Results (most recent):  Results from [place] encounter on 03/27/21   CT ABD PELV W CONT    Narrative EXAM:  CT CHEST W CONT, CT ABD PELV W CONT    INDICATION: Abnormal CXR, 30+ lb weight loss over past 6 months, chronic cough    COMPARISON: None. CONTRAST:  100 mL of Isovue-370. TECHNIQUE:   Following the uneventful intravenous administration of contrast, thin axial  images were obtained through the chest, abdomen and pelvis. Coronal and sagittal  reconstructions were generated. Oral contrast was not administered. CT dose  reduction was achieved through use of a standardized protocol tailored for this  examination and automatic exposure control for dose modulation. FINDINGS:   Chest:  Lungs/Pleura: There is complete atelectasis of the left lower lobe with a mass  in the left lower lobe infrahilar region obstructing the left lower lobe  bronchus, which appears to measure approximately 5.6 x 4.5 cm. There are  innumerable other pulmonary nodules/masses, largest examples as below:  Right upper lobe mass measuring 2.8 x 2.5 cm (series 4 image 17). Right lower lobe mass measuring 3.5 x 3.2 cm (series 4 image 32). Right middle lobe mass measuring 2.3 x 1.6 and meter (series 4 image 35). Left upper 1lobe mass measuring 4.6 x 4.0 cm (series 4 image 627). Left upper lobe mass measuring 2.8 x 2.5 cm (series 4 image 12). Small left pleural effusion. Axilla/Soft Tissue: No pathologic axillary adenopathy. Multinodular thyroid  gland, largest nodule in the right thyroid lobe measuring 16 mm. Mediastinum: Cardiomegaly. Trace pericardial effusion. No pathologic mediastinal  or hilar adenopathy. Bones: No evidence of acute fracture, dislocation, or aggressive osseous  abnormality.     Abdomen/Pelvis:  Liver:  Numerous heterogeneous enhancing, hypodense hepatic lesions, consistent  with metastases, largest examples as below:  Segment 2/3 metastasis measuring 7.4 x 5.5 cm (series 8 image 19). Segment 2/3 metastasis measuring 4.3 x 4.2 cm (series 8 image 27). Segment 4 metastasis measuring 4.8 x 4.0 cm (series 8 image 28). Segment 8 metastasis measuring 3.5 x 2.9 cm (series 8 image 21). Biliary system: Gallbladder is markable. No intrahepatic or extrahepatic biliary  ductal dilatation. Spleen: Normal.    Pancreas: Normal.    Kidneys/Ureters/Bladder: Indeterminant hypodense lesion in the lower pole the  right kidney measuring 1.4 x 1.2 cm (series 610 image 41). There are multiple  other simple cysts in the kidneys, largest in the lower pole the right kidney  measuring 2.5 cm, which do not require follow-up. No renal or ureteral calculi. No hydronephrosis or hydroureter. The bladder is normal.    Adrenals: Normal.    Stomach/bowel: No dilation or abnormal wall thickening is present. No free  intraperitoneal air noted. Reproductive Organs: Uterus is present. No suspicious adnexal masses. Vasculature: Normal caliber arteries. Moderate calcific atherosclerosis of the  abdominal aorta and iliac vasculature. Portal vein, SMV, and splenic vein are  patent. Nodes: No pathologically enlarged lymph nodes. Fluid: Trace ascites. Bones/Soft Tissue: No acute fractures or aggressive osseous lesions are seen. Multilevel degenerative disc disease throughout the lumbar spine. Impression Chest:  1. Left lower lobe infrahilar mass measuring 5.6 x 4.5 cm, with obstruction of  the left lower lobe bronchus and complete atelectasis of the left lower lobe. This may represent primary lung malignancy. 2. Innumerable metastatic pulmonary masses and nodules throughout the lungs,  largest examples as given above. 3. Small left pleural effusion. Abdomen/pelvis:  1. Numerous large hepatic metastases.   2. Indeterminate hypodense lesion in the lower pole of the right kidney  measuring 1.4 x 1.2 cm. This can be further evaluated with CT or MRI renal  protocol. Assessment/ Plan     1. Metastatic colon adenocarcinoma cancer to Lung, brain and liver  Stage IV disease; her disease is treatable but not curable, goal of therapy is palliative    Discussed that without therapy, life expectancy would be 3-6 months. With therapy, likely 1-2 years. Discussed therapy would likely be with IV chemotherapy, though further testing is underway on her tumor to see if immunotherapy may be an option. caris testing has been sent. Chemotherapy would likely be every other week. Discussed first, we would consider GK to her brain, see below. Discussed alternatively, she may decline therapy, and elect for hospice, and we would keep her comfortable. I am concerned about her ability to tolerate systemic therapy with her current performance status    She will consider her options and give us a call back in a few days. CEA  8,[phone] 3/30/21    2. Brain mets  Single metastatic focus in left cerebellar hemisphere noted on brain MRI  - Rad-Onc consulted while inpatient: reviewed with Dr Clarice Ovalles;  Plan was for [facility] knife in the future with Dr Erica Auguste as outpatient. They have called pt but she has not yet returned their call. Discussed with pt and she will consider her options as above. 3. Severe protein calorie malnutrition:  continues      The patient was evaluated through a synchronous (real-time) audio-video encounter. The patient (or guardian if applicable) is aware that this is a billable service. Verbal consent to proceed has been obtained within the past 12 months. The visit was conducted pursuant to the emergency declaration under the [facility], [address] [place] [address] authority and the [email or web address] General Act. Patient identification was verified, and a caregiver was present when appropriate.  The patient was located in a state where the provider was credentialed to provide care.           Signed By: Nahomy Trevino MD     April 6, 2021

## 2021-04-21 ENCOUNTER — TELEPHONE (OUTPATIENT)
Dept: ONCOLOGY | Age: 76
End: 2021-04-21

## 2021-04-27 NOTE — TELEPHONE ENCOUNTER
Called patient to Schedule a follow up appt. No answer, left a vm. Attempting to do a virtual visit.

## 2021-05-03 ENCOUNTER — TELEPHONE (OUTPATIENT)
Dept: ONCOLOGY | Age: 76
End: 2021-05-03

## 2021-05-11 ENCOUNTER — TELEPHONE (OUTPATIENT)
Dept: ONCOLOGY | Age: 76
End: 2021-05-11

## 2021-05-12 NOTE — TELEPHONE ENCOUNTER
Patient callback to let our office know that she has another doctor and she will not be making any appt.

## 2022-03-18 PROBLEM — E43 SEVERE PROTEIN-CALORIE MALNUTRITION (GOMEZ: LESS THAN 60% OF STANDARD WEIGHT) (HCC): Status: ACTIVE | Noted: 2021-03-28

## 2022-03-18 PROBLEM — R20.0 NUMBNESS AND TINGLING OF LEFT HAND: Status: ACTIVE | Noted: 2021-03-27

## 2022-03-18 PROBLEM — R20.2 NUMBNESS AND TINGLING OF LEFT HAND: Status: ACTIVE | Noted: 2021-03-27

## 2022-03-19 PROBLEM — C79.31 LUNG CANCER METASTATIC TO BRAIN (HCC): Status: ACTIVE | Noted: 2021-03-28

## 2022-03-19 PROBLEM — I10 HYPERTENSION: Status: ACTIVE | Noted: 2021-03-27

## 2022-03-19 PROBLEM — C79.9 METASTATIC CANCER (HCC): Status: ACTIVE | Noted: 2021-03-27

## 2022-03-19 PROBLEM — C34.90 LUNG CANCER METASTATIC TO BRAIN (HCC): Status: ACTIVE | Noted: 2021-03-28

## 2022-03-19 PROBLEM — E78.5 HYPERLIPIDEMIA: Status: ACTIVE | Noted: 2021-03-27

## 2023-05-12 RX ORDER — ATORVASTATIN CALCIUM 40 MG/1
TABLET, FILM COATED ORAL
COMMUNITY
Start: 2021-03-30

## 2023-05-12 RX ORDER — LOSARTAN POTASSIUM 50 MG/1
50 TABLET ORAL DAILY
COMMUNITY